# Patient Record
Sex: FEMALE | Employment: FULL TIME | URBAN - METROPOLITAN AREA
[De-identification: names, ages, dates, MRNs, and addresses within clinical notes are randomized per-mention and may not be internally consistent; named-entity substitution may affect disease eponyms.]

---

## 2019-04-15 ENCOUNTER — OFFICE VISIT (OUTPATIENT)
Dept: PRIMARY CARE CLINIC | Age: 59
End: 2019-04-15

## 2019-04-15 VITALS
SYSTOLIC BLOOD PRESSURE: 110 MMHG | HEART RATE: 83 BPM | DIASTOLIC BLOOD PRESSURE: 65 MMHG | RESPIRATION RATE: 18 BRPM | HEIGHT: 62 IN | TEMPERATURE: 98.4 F | OXYGEN SATURATION: 98 % | WEIGHT: 131.2 LBS | BODY MASS INDEX: 24.14 KG/M2

## 2019-04-15 DIAGNOSIS — R29.810 FACIAL WEAKNESS: ICD-10-CM

## 2019-04-15 DIAGNOSIS — K21.9 GASTROESOPHAGEAL REFLUX DISEASE WITHOUT ESOPHAGITIS: ICD-10-CM

## 2019-04-15 DIAGNOSIS — Z00.00 ENCOUNTER FOR WELLNESS EXAMINATION IN ADULT: Primary | ICD-10-CM

## 2019-04-15 DIAGNOSIS — Q67.0 FACIAL ASYMMETRY: ICD-10-CM

## 2019-04-15 DIAGNOSIS — Z12.11 SCREENING FOR COLON CANCER: ICD-10-CM

## 2019-04-15 DIAGNOSIS — Z12.39 SCREENING FOR BREAST CANCER: ICD-10-CM

## 2019-04-15 DIAGNOSIS — Z11.59 ENCOUNTER FOR HEPATITIS C SCREENING TEST FOR LOW RISK PATIENT: ICD-10-CM

## 2019-04-15 DIAGNOSIS — Z78.0 POSTMENOPAUSAL: ICD-10-CM

## 2019-04-15 DIAGNOSIS — I10 ESSENTIAL HYPERTENSION: ICD-10-CM

## 2019-04-15 RX ORDER — SIMETHICONE 125 MG
125 TABLET,CHEWABLE ORAL
COMMUNITY

## 2019-04-15 RX ORDER — RANITIDINE 150 MG/1
150 TABLET, FILM COATED ORAL AS NEEDED
COMMUNITY
End: 2020-07-30 | Stop reason: ALTCHOICE

## 2019-04-15 RX ORDER — AMLODIPINE BESYLATE 10 MG/1
10 TABLET ORAL DAILY
Qty: 30 TAB | Refills: 1 | Status: SHIPPED | OUTPATIENT
Start: 2019-04-15 | End: 2019-05-20 | Stop reason: SDUPTHER

## 2019-04-15 RX ORDER — AMLODIPINE BESYLATE 10 MG/1
TABLET ORAL DAILY
COMMUNITY
End: 2019-04-15 | Stop reason: SDUPTHER

## 2019-04-15 NOTE — PATIENT INSTRUCTIONS
Well Visit, Women 48 to 72: Care Instructions  Your Care Instructions    Physical exams can help you stay healthy. Your doctor has checked your overall health and may have suggested ways to take good care of yourself. He or she also may have recommended tests. At home, you can help prevent illness with healthy eating, regular exercise, and other steps. Follow-up care is a key part of your treatment and safety. Be sure to make and go to all appointments, and call your doctor if you are having problems. It's also a good idea to know your test results and keep a list of the medicines you take. How can you care for yourself at home? · Reach and stay at a healthy weight. This will lower your risk for many problems, such as obesity, diabetes, heart disease, and high blood pressure. · Get at least 30 minutes of exercise on most days of the week. Walking is a good choice. You also may want to do other activities, such as running, swimming, cycling, or playing tennis or team sports. · Do not smoke. Smoking can make health problems worse. If you need help quitting, talk to your doctor about stop-smoking programs and medicines. These can increase your chances of quitting for good. · Protect your skin from too much sun. When you're outdoors from 10 a.m. to 4 p.m., stay in the shade or cover up with clothing and a hat with a wide brim. Wear sunglasses that block UV rays. Even when it's cloudy, put broad-spectrum sunscreen (SPF 30 or higher) on any exposed skin. · See a dentist one or two times a year for checkups and to have your teeth cleaned. · Wear a seat belt in the car. · Limit alcohol to 1 drink a day. Too much alcohol can cause health problems. Follow your doctor's advice about when to have certain tests. These tests can spot problems early. · Cholesterol.  Your doctor will tell you how often to have this done based on your age, family history, or other things that can increase your risk for heart attack and stroke. · Blood pressure. Have your blood pressure checked during a routine doctor visit. Your doctor will tell you how often to check your blood pressure based on your age, your blood pressure results, and other factors. · Mammogram. Ask your doctor how often you should have a mammogram, which is an X-ray of your breasts. A mammogram can spot breast cancer before it can be felt and when it is easiest to treat. · Pap test and pelvic exam. Ask your doctor how often you should have a Pap test. You may not need to have a Pap test as often as you used to. · Vision. Have your eyes checked every year or two or as often as your doctor suggests. Some experts recommend that you have yearly exams for glaucoma and other age-related eye problems starting at age 48. · Hearing. Tell your doctor if you notice any change in your hearing. You can have tests to find out how well you hear. · Diabetes. Ask your doctor whether you should have tests for diabetes. · Colon cancer. You should begin tests for colon cancer at age 48. You may have one of several tests. Your doctor will tell you how often to have tests based on your age and risk. Risks include whether you already had a precancerous polyp removed from your colon or whether your parents, sisters and brothers, or children have had colon cancer. · Thyroid disease. Talk to your doctor about whether to have your thyroid checked as part of a regular physical exam. Women have an increased chance of a thyroid problem. · Osteoporosis. You should begin tests for bone density at age 72. If you are younger than 72, ask your doctor whether you have factors that may increase your risk for this disease. You may want to have this test before age 72. · Heart attack and stroke risk. At least every 4 to 6 years, you should have your risk for heart attack and stroke assessed.  Your doctor uses factors such as your age, blood pressure, cholesterol, and whether you smoke or have diabetes to show what your risk for a heart attack or stroke is over the next 10 years. When should you call for help? Watch closely for changes in your health, and be sure to contact your doctor if you have any problems or symptoms that concern you. Where can you learn more? Go to http://oksana-damián.info/. Enter R776 in the search box to learn more about \"Well Visit, Women 50 to 72: Care Instructions. \"  Current as of: March 28, 2018  Content Version: 11.9  © 9834-1583 [employer]. Care instructions adapted under license by [employer] (which disclaims liability or warranty for this information). If you have questions about a medical condition or this instruction, always ask your healthcare professional. [address] any warranty or liability for your use of this information.

## 2019-04-15 NOTE — PROGRESS NOTES
HPI:     Chief Complaint   Patient presents with    Establish Care    Complete Physical       Danny Hernández is a 60 y.o. female with significant history of HTN, GERD who presents as new patient for physical.  She is here with her daughter and family friend. She declines interpretor services, prefers to have daughter and friend translate. Originally from [place]. Patient is concerned about chronic left sided facial weakness, eye twitching, facial asymmetry for the past 5 years. No acute worsening of symptoms. Denies any known history of stroke, cerebrovascular disease. Denies associated vision change, blurry vision, confusion, dizziness, arm or leg weakness, paresthesias, headache, speech difficulty, difficulty walking. HTN - diagnosed 5 years ago. Compliant with Amlodipine 10mg daily. Tolerating med well without side effects. She denies chest pain, palpitations, dyspnea, peripheral edema. Does not check BP at home. GERD - stable and well controlled with PRN Zantac. Symptoms of belching, heartburn, reflux well controlled with med. Symptoms exacerbated by spicy foods, which she avoids. Patient has appointment to establish with OB/GYN Dr. Paul Gu in June. Reports LMP was 8 years ago. States that she has never had a pap smear, mammogram, or DEXA scan. Patient Active Problem List   Diagnosis Code    Essential hypertension I10    Gastroesophageal reflux disease without esophagitis K21.9     Current Outpatient Medications   Medication Sig Dispense Refill    raNITIdine (ZANTAC) 150 mg tablet Take 150 mg by mouth as needed for Indigestion.  simethicone (GAS-X EXTRA STRENGTH) 125 mg chewable tablet Take 125 mg by mouth every six (6) hours as needed for Flatulence.  amLODIPine (NORVASC) 10 mg tablet Take 1 Tab by mouth daily.  30 Tab 1     No Known Allergies  Past Medical History:   Diagnosis Date    GERD (gastroesophageal reflux disease)     Hypertension      Past Surgical History:   Procedure Laterality Date    HX GI  1999    part of colon removed     Family History   Problem Relation Age of Onset    Other Mother         brain aneurysm    No Known Problems Father     Colon Cancer Sister         age 46s     Social History     Tobacco Use    Smoking status: Never Smoker    Smokeless tobacco: Never Used   Substance Use Topics    Alcohol use: Never     Frequency: Never          ROS:     ROS:  Feeling well. No dyspnea, palpitations, or chest pain on exertion. No abdominal pain, change in appetite, nausea, vomiting, change in bowel habits, black or bloody stools. No urinary tract symptoms. GYN ROS: no abnormal bleeding, pelvic pain or discharge, no breast pain or new or enlarging lumps on self exam. No fever or chills. Otherwise, as documented in HPI. Physical Exam:     Vitals:    04/15/19 1417   BP: 110/65   Pulse: 83   Resp: 18   Temp: 98.4 °F (36.9 °C)   TempSrc: Oral   SpO2: 98%   Weight: 131 lb 3.2 oz (59.5 kg)   Height: 5' 2\" (1.575 m)        Nursing Documentation and Vital Signs Reviewed.      Physical Examination:   General appearance - alert, well appearing, and in no distress  Mental status - alert, oriented to person, place, and time, normal mood, behavior, speech, dress, motor activity, and thought processes  Eyes - pupils equal and reactive, left sided ptosis noted   Ears - bilateral TM's and external ear canals normal  Nose - normal and patent, no erythema, discharge or polyps  Mouth - mucous membranes moist, pharynx normal without lesions  Neck - supple, no significant adenopathy, carotids upstroke normal bilaterally, no bruits, thyroid is normal in size without nodules or tenderness  Chest - clear to auscultation, no wheezes, rales or rhonchi, symmetric air entry  Heart - normal rate, regular rhythm, normal S1, S2, no murmurs, rubs, clicks or gallops  Abdomen - soft, nontender, nondistended, no masses or organomegaly  Neurological - alert, oriented, normal speech. Asymmetric facial muscle function noted, left sided facial droop. Neck supple without rigidity, normal muscle tone, no tremors, strength 5/5  Musculoskeletal - no joint tenderness, deformity or swelling  Extremities - peripheral pulses normal, no pedal edema, no clubbing or cyanosis      Assessment/ Plan:   Healthy adult female. Full age appropriate history and physical exam as well as health care maintenance  performed and discussed today. Risk factor modification discussed today includes safe sex practices, healthy diet and exercise, and seat belt use. Counseled on breast self-exam and mammography schedule. Pap smear schedule reviewed. Continue current medications as prescribed. Diagnoses and all orders for this visit:    1. Encounter for wellness examination in adult  -     CBC WITH AUTOMATED DIFF  -     METABOLIC PANEL, COMPREHENSIVE  -     TSH AND FREE T4  -     LIPID PANEL  -     HEMOGLOBIN A1C WITH EAG    2. Facial weakness  -     Patient with 5 year history of left sided facial weakness, asymmetry. Denies history of stroke, cerebrovascular disease. No acute worsening of symptoms from patient's baseline. Will refer to neurology for further evaluation. Discussed with Dr. Rebecca Lamb who agrees with plan. -     REFERRAL TO NEUROLOGY    3. Facial asymmetry  -     See #2.  -     REFERRAL TO NEUROLOGY    4. Essential hypertension  -     Stable and well controlled. -     Continue amLODIPine (NORVASC) 10 mg tablet; Take 1 Tab by mouth daily. Tolerating well without side effects.  -     Advised to periodically monitor BP at home and follow-up if persistently >302/71X.   -     METABOLIC PANEL, COMPREHENSIVE  -     LIPID PANEL  -     HEMOGLOBIN A1C WITH EAG    5. Gastroesophageal reflux disease without esophagitis        -     Stable and well controlled with Zantac. 6. Screening for breast cancer  -     [facility] 3D PRIYANKA W MAMMO BI SCREENING INCL CAD; Future    7.  Screening for colon cancer  - REFERRAL TO GASTROENTEROLOGY    8. Postmenopausal  -     DEXA BONE DENSITY STUDY AXIAL; Future    9. Encounter for hepatitis C screening test for low risk patient  -     HEPATITIS C AB      Follow-up and Dispositions    · Return in about 3 months (around 7/15/2019) for blood pressure follow-up. Discussed expected course/resolution/complications of diagnosis in detail with patient. Medication risks/benefits/costs/interactions/alternatives discussed with patient. Pt was given after visit summary which includes diagnoses, current medications & vitals.    Pt expressed understanding with the diagnosis and plan

## 2019-04-17 ENCOUNTER — TELEPHONE (OUTPATIENT)
Dept: PRIMARY CARE CLINIC | Age: 59
End: 2019-04-17

## 2019-04-17 LAB
ALBUMIN SERPL-MCNC: 4.9 G/DL (ref 3.6–4.8)
ALBUMIN/GLOB SERPL: 1.4 {RATIO} (ref 1.2–2.2)
ALP SERPL-CCNC: 85 IU/L (ref 39–117)
ALT SERPL-CCNC: 18 IU/L (ref 0–32)
AST SERPL-CCNC: 25 IU/L (ref 0–40)
BASOPHILS # BLD AUTO: 0 X10E3/UL (ref 0–0.2)
BASOPHILS NFR BLD AUTO: 1 %
BILIRUB SERPL-MCNC: 0.4 MG/DL (ref 0–1.2)
BUN SERPL-MCNC: 12 MG/DL (ref 8–27)
BUN/CREAT SERPL: 14 (ref 12–28)
CALCIUM SERPL-MCNC: 9.9 MG/DL (ref 8.7–10.3)
CHLORIDE SERPL-SCNC: 101 MMOL/L (ref 96–106)
CHOLEST SERPL-MCNC: 211 MG/DL (ref 100–199)
CO2 SERPL-SCNC: 20 MMOL/L (ref 20–29)
CREAT SERPL-MCNC: 0.85 MG/DL (ref 0.57–1)
EOSINOPHIL # BLD AUTO: 0.2 X10E3/UL (ref 0–0.4)
EOSINOPHIL NFR BLD AUTO: 5 %
ERYTHROCYTE [DISTWIDTH] IN BLOOD BY AUTOMATED COUNT: 14.6 % (ref 12.3–15.4)
EST. AVERAGE GLUCOSE BLD GHB EST-MCNC: 105 MG/DL
GLOBULIN SER CALC-MCNC: 3.6 G/DL (ref 1.5–4.5)
GLUCOSE SERPL-MCNC: 90 MG/DL (ref 65–99)
HBA1C MFR BLD: 5.3 % (ref 4.8–5.6)
HCT VFR BLD AUTO: 42.5 % (ref 34–46.6)
HCV AB S/CO SERPL IA: >11 S/CO RATIO (ref 0–0.9)
HDLC SERPL-MCNC: 49 MG/DL
HGB BLD-MCNC: 13.8 G/DL (ref 11.1–15.9)
IMM GRANULOCYTES # BLD AUTO: 0 X10E3/UL (ref 0–0.1)
IMM GRANULOCYTES NFR BLD AUTO: 0 %
LDLC SERPL CALC-MCNC: 132 MG/DL (ref 0–99)
LYMPHOCYTES # BLD AUTO: 1.8 X10E3/UL (ref 0.7–3.1)
LYMPHOCYTES NFR BLD AUTO: 34 %
MCH RBC QN AUTO: 28.2 PG (ref 26.6–33)
MCHC RBC AUTO-ENTMCNC: 32.5 G/DL (ref 31.5–35.7)
MCV RBC AUTO: 87 FL (ref 79–97)
MONOCYTES # BLD AUTO: 0.3 X10E3/UL (ref 0.1–0.9)
MONOCYTES NFR BLD AUTO: 6 %
NEUTROPHILS # BLD AUTO: 3 X10E3/UL (ref 1.4–7)
NEUTROPHILS NFR BLD AUTO: 54 %
PLATELET # BLD AUTO: 266 X10E3/UL (ref 150–379)
POTASSIUM SERPL-SCNC: 4.1 MMOL/L (ref 3.5–5.2)
PROT SERPL-MCNC: 8.5 G/DL (ref 6–8.5)
RBC # BLD AUTO: 4.9 X10E6/UL (ref 3.77–5.28)
SODIUM SERPL-SCNC: 143 MMOL/L (ref 134–144)
T4 FREE SERPL-MCNC: 1.35 NG/DL (ref 0.82–1.77)
TRIGL SERPL-MCNC: 149 MG/DL (ref 0–149)
TSH SERPL DL<=0.005 MIU/L-ACNC: 2.66 UIU/ML (ref 0.45–4.5)
VLDLC SERPL CALC-MCNC: 30 MG/DL (ref 5–40)
WBC # BLD AUTO: 5.3 X10E3/UL (ref 3.4–10.8)

## 2019-04-17 NOTE — TELEPHONE ENCOUNTER
----- Message from Tosha Baeza NP sent at 0/63/0636  1:41 PM EDT -----  Please notify patient:    Hepatitis C antibody screening is positive. Needs to return for follow-up blood work. Total cholesterol and LDL (bad cholesterol) are elevated above the normal range. No medication is indicated at this time, but work on diet and exercise. Decrease fried, fatty foods. Bake, broil, grill, or steam foods instead of frying them. Eat fish, skinless poultry, limit high-fat meats. Try to eat two servings of fish a week (such as salmon). Increase fruits, vegetables, fiber, whole-grains. Limit alcohol intake. Try to engage in daily physical activity, 150 minutes per week of exercise is recommended. Otherwise, CBC, kidney, liver, and thyroid level are normal.  No diabetes (Hgb A1c is within normal range).

## 2019-04-17 NOTE — PROGRESS NOTES
Please notify patient:    Hepatitis C antibody screening is positive. Needs to return for follow-up blood work. Total cholesterol and LDL (bad cholesterol) are elevated above the normal range. No medication is indicated at this time, but work on diet and exercise. Decrease fried, fatty foods. Bake, broil, grill, or steam foods instead of frying them. Eat fish, skinless poultry, limit high-fat meats. Try to eat two servings of fish a week (such as salmon). Increase fruits, vegetables, fiber, whole-grains. Limit alcohol intake. Try to engage in daily physical activity, 150 minutes per week of exercise is recommended. Otherwise, CBC, kidney, liver, and thyroid level are normal.  No diabetes (Hgb A1c is within normal range).

## 2019-04-22 ENCOUNTER — HOSPITAL ENCOUNTER (OUTPATIENT)
Dept: MAMMOGRAPHY | Age: 59
Discharge: HOME | End: 2019-04-22
Attending: NURSE PRACTITIONER
Payer: COMMERCIAL

## 2019-04-22 DIAGNOSIS — Z12.39 SCREENING FOR BREAST CANCER: ICD-10-CM

## 2019-04-22 PROCEDURE — 77063 BREAST TOMOSYNTHESIS BI: CPT

## 2019-04-29 ENCOUNTER — HOSPITAL ENCOUNTER (OUTPATIENT)
Dept: MAMMOGRAPHY | Age: 59
Discharge: HOME | End: 2019-04-29
Payer: COMMERCIAL

## 2019-04-29 DIAGNOSIS — Z78.0 POSTMENOPAUSAL: ICD-10-CM

## 2019-04-29 PROCEDURE — 77080 DXA BONE DENSITY AXIAL: CPT

## 2019-05-01 ENCOUNTER — TELEPHONE (OUTPATIENT)
Dept: PRIMARY CARE CLINIC | Age: 59
End: 2019-05-01

## 2019-05-01 NOTE — PROGRESS NOTES
Please call patient:    Bone density scan shows osteoporosis (low bone density). If possible, I'd like to have patient set up appointment to discuss further/discuss medication options.

## 2019-05-01 NOTE — TELEPHONE ENCOUNTER
----- Message from Cailin John NP sent at 4/2/0676  7:33 AM EDT -----  Please call patient:    Bone density scan shows osteoporosis (low bone density). If possible, I'd like to have patient set up appointment to discuss further/discuss medication options.

## 2019-05-14 ENCOUNTER — OFFICE VISIT (OUTPATIENT)
Dept: NEUROLOGY | Age: 59
End: 2019-05-14

## 2019-05-14 VITALS
BODY MASS INDEX: 23.89 KG/M2 | HEIGHT: 62 IN | WEIGHT: 129.8 LBS | OXYGEN SATURATION: 98 % | SYSTOLIC BLOOD PRESSURE: 124 MMHG | RESPIRATION RATE: 18 BRPM | HEART RATE: 87 BPM | DIASTOLIC BLOOD PRESSURE: 80 MMHG

## 2019-05-14 DIAGNOSIS — G51.39 HEMIFACIAL SPASM: Primary | ICD-10-CM

## 2019-05-14 DIAGNOSIS — G24.5 BLEPHAROSPASM: ICD-10-CM

## 2019-05-14 NOTE — LETTER
5/14/2019 10:27 AM 
 
Patient:  Teddy Trujillo YOB: 1960 Date of Visit: 5/14/2019 Dear Chula Lauren, ELIZABETH 
[address] [place] [id number] VIA In Basket 
 : Thank you for referring Ms. Danny Hernández to me for evaluation/treatment. Below are the relevant portions of my assessment and plan of care. If you have questions, please do not hesitate to call me. I look forward to following Ms. Hernández along with you. Sincerely, Leila Horowitz MD

## 2019-05-14 NOTE — PATIENT INSTRUCTIONS
A Healthy Lifestyle: Care Instructions Your Care Instructions A healthy lifestyle can help you feel good, stay at a healthy weight, and have plenty of energy for both work and play. A healthy lifestyle is something you can share with your whole family. A healthy lifestyle also can lower your risk for serious health problems, such as high blood pressure, heart disease, and diabetes. You can follow a few steps listed below to improve your health and the health of your family. Follow-up care is a key part of your treatment and safety. Be sure to make and go to all appointments, and call your doctor if you are having problems. It's also a good idea to know your test results and keep a list of the medicines you take. How can you care for yourself at home? · Do not eat too much sugar, fat, or fast foods. You can still have dessert and treats now and then. The goal is moderation. · Start small to improve your eating habits. Pay attention to portion sizes, drink less juice and soda pop, and eat more fruits and vegetables. ? Eat a healthy amount of food. A 3-ounce serving of meat, for example, is about the size of a deck of cards. Fill the rest of your plate with vegetables and whole grains. ? Limit the amount of soda and sports drinks you have every day. Drink more water when you are thirsty. ? Eat at least 5 servings of fruits and vegetables every day. It may seem like a lot, but it is not hard to reach this goal. A serving or helping is 1 piece of fruit, 1 cup of vegetables, or 2 cups of leafy, raw vegetables. Have an apple or some carrot sticks as an afternoon snack instead of a candy bar. Try to have fruits and/or vegetables at every meal. 
· Make exercise part of your daily routine. You may want to start with simple activities, such as walking, bicycling, or slow swimming. Try to be active 30 to 60 minutes every day.  You do not need to do all 30 to 60 minutes all at once. For example, you can exercise 3 times a day for 10 or 20 minutes. Moderate exercise is safe for most people, but it is always a good idea to talk to your doctor before starting an exercise program. 
· Keep moving. [place] the lawn, work in the garden, or [employer]. Take the stairs instead of the elevator at work. · If you smoke, quit. People who smoke have an increased risk for heart attack, stroke, cancer, and other lung illnesses. Quitting is hard, but there are ways to boost your chance of quitting tobacco for good. ? Use nicotine gum, patches, or lozenges. ? Ask your doctor about stop-smoking programs and medicines. ? Keep trying. In addition to reducing your risk of diseases in the future, you will notice some benefits soon after you stop using tobacco. If you have shortness of breath or asthma symptoms, they will likely get better within a few weeks after you quit. · Limit how much alcohol you drink. Moderate amounts of alcohol (up to 2 drinks a day for men, 1 drink a day for women) are okay. But drinking too much can lead to liver problems, high blood pressure, and other health problems. Family health If you have a family, there are many things you can do together to improve your health. · Eat meals together as a family as often as possible. · Eat healthy foods. This includes fruits, vegetables, lean meats and dairy, and whole grains. · Include your family in your fitness plan. Most people think of activities such as jogging or tennis as the way to fitness, but there are many ways you and your family can be more active. Anything that makes you breathe hard and gets your heart pumping is exercise. Here are some tips: 
? Walk to do errands or to take your child to school or the bus. 
? Go for a family bike ride after dinner instead of watching TV. Where can you learn more? Go to http://oksana-damián.info/. Enter Y938 in the search box to learn more about \"A Healthy Lifestyle: Care Instructions. \" Current as of: September 11, 2018 Content Version: 11.9 © 4002-8800 [employer]. Care instructions adapted under license by [employer] (which disclaims liability or warranty for this information). If you have questions about a medical condition or this instruction, always ask your healthcare professional. [place] [address] any warranty or liability for your use of this information. PRESCRIPTION REFILL POLICY [facility] Statement to Patients April 1, 2014 In an effort to ensure the large volume of patient prescription refills is processed in the most efficient and expeditious manner, we are asking our patients to assist us by calling your Pharmacy for all prescription refills, this will include also your  Mail Order Pharmacy. The pharmacy will contact our office electronically to continue the refill process. Please do not wait until the last minute to call your pharmacy. We need at least 48 hours (2days) to fill prescriptions. We also encourage you to call your pharmacy before going to  your prescription to make sure it is ready. With regard to controlled substance prescription refill requests (narcotic refills) that need to be picked up at our office, we ask your cooperation by providing us with at least 72 hours (3days) notice that you will need a refill. We will not refill narcotic prescription refill requests after 4:00pm on any weekday, Monday through Thursday, or after 2:00pm on Fridays, or on the weekends. We encourage everyone to explore another way of getting your prescription refill request processed using [employer], our patient web portal through our electronic medical record system.  MAPPINGt is an efficient and effective way to communicate your medication request directly to the office and downloadable as an chico on your smart phone . amiando also features a review functionality that allows you to view your medication list as well as leave messages for your physician. Are you ready to get connected? If so please review the attatched instructions or speak to any of our staff to get you set up right away! Thank you so much for your cooperation. Should you have any questions please contact our Practice Administrator. The Physicians and Staff,  [facility]

## 2019-05-14 NOTE — PROGRESS NOTES
Chief Complaint Patient presents with  Neurologic Problem HISTORY OF PRESENT ILLNESS Zhanna Silva is a 60 y.o. female who came in for neurological consultation requested by Dr. Diana Jaime. For the past 5 or 6 years she has been experiencing clonic twitching of her left face muscles and it seems to be getting worse with time. The left eye will tend to blink repeatedly in the face will pull sideways/upwards. The muscles on the side of her neck will also twitch at times. Denies any associated pain or headache. No history of Bell's palsy. No difficulties with swallowing or chewing. Recently she has started to notice that the right has started to quiver. No other neurological symptoms Past Medical History:  
Diagnosis Date  GERD (gastroesophageal reflux disease)  Hypertension  Liver disease 04/2019 Hep C Current Outpatient Medications Medication Sig  
 raNITIdine (ZANTAC) 150 mg tablet Take 150 mg by mouth as needed for Indigestion.  simethicone (GAS-X EXTRA STRENGTH) 125 mg chewable tablet Take 125 mg by mouth every six (6) hours as needed for Flatulence.  amLODIPine (NORVASC) 10 mg tablet Take 1 Tab by mouth daily. No current facility-administered medications for this visit. No Known Allergies Family History Problem Relation Age of Onset  Other Mother   
     brain aneurysm  No Known Problems Father  Colon Cancer Sister   
     age 46s Social History Tobacco Use  Smoking status: Never Smoker  Smokeless tobacco: Never Used Substance Use Topics  Alcohol use: Never Frequency: Never  Drug use: Never Past Surgical History:  
Procedure Laterality Date  HX GI  1999  
 part of colon removed REVIEW OF SYSTEMS Review of Systems - History obtained from the patient Psychological ROS: negative ENT ROS: negative Hematological and Lymphatic ROS: negative Endocrine ROS: negative Respiratory ROS: no cough, shortness of breath, or wheezing Cardiovascular ROS: no chest pain or dyspnea on exertion Gastrointestinal ROS: no abdominal pain, change in bowel habits, or black or bloody stools Genito-Urinary ROS: no dysuria, trouble voiding, or hematuria Musculoskeletal ROS: negative Dermatological ROS: negative PHYSICAL EXAMINATION:   
Visit Vitals /80 Pulse 87 Resp 18 Ht 5' 2\" (1.575 m) Wt 58.9 kg (129 lb 12.8 oz) SpO2 98% BMI 23.74 kg/m² General:  Well nourished, and groomed individual in no acute distress. Neck: Supple, nontender, thyroid within normal limits, no JVD, no bruits, no pain with resistance to active range of motion. Heart: Regular rate and rhythm, no murmurs, rub, or gallop. Normal S1S2. Lungs:  Clear to auscultation bilaterally with equal chest expansion, no cough, no wheeze Musculoskeletal:  Extremities revealed no edema and had full range of motion of joints. Psych:  Good mood and bright affect NEUROLOGICAL EXAMINATION:    
Mental Status:   Alert and oriented to person, place, and time. Attention span and concentration are normal. Speech is fluent with a full fund of knowledge. Cranial Nerves:   
II, III, IV, VI:  Visual acuity grossly intact. Visual fields are normal.   
Pupils are equal, round, and reactive to light and accommodation. Extra-ocular movements are full and fluid. V-XII: Hearing is grossly intact. Facial features are symmetric, with normal sensation and strength. Hemifacial spasm was noted involving the orbicularis oculi, alaque nasi, zygomaticus and risorius muscles. Some twitching was also noted in the right orbicularis oculi. The palate rises symmetrically and the tongue protrudes midline. Sternocleidomastoids 5/5. Motor Examination: Normal tone, bulk, and strength. 5/5 muscle strength throughout. No cogwheel rigidity or clonus present. Sensory exam:  Normal throughout to pinprick, temperature, and vibration sense. Normal proprioception. Coordination:  Heel-to-shin was smooth and symmetrical bilaterally. Finger to nose and rapid arm movement testing was normal.   No resting or intention tremor Gait and Station:  Steady while walking on toes, heels, and with tandem walking. Normal arm swing. No Rhomberg or pronator drift. No muscle wasting or fasiculations noted. Reflexes:  DTRs 2+ throughout. Toes downgoing. ASSESSMENT 
  ICD-10-CM ICD-9-CM 1. Hemifacial spasm G51.39 351.8 MRI BRAIN W WO CONT 2. Blepharospasm G24.5 333.81 MRI BRAIN W WO CONT  
 
 
DISCUSSION Ms. Danny Hernández has hemifacial spasm on the left side and blepharospasm on both sides, left worse than right I have recommended MRI scan of the brain to rule out any structural cause of irritation of the facial nerve Treatment options were discussed and that botulinum toxin being the only effective medication She seemed hesitant and reluctant to do Botox at this time and will let me know if she decides to go forward I will let her know about the MRI results Thank you for allowing me to participate in the care of Ms. Hernández. Please feel free to contact me if you have any questions. I will be happy to follow to follow her along with you. Carline Durham MD 
Diplomate, American Board of Psychiatry & Neurology (Neurology) [place] Board of Psychiatry & Neurology (Clinical Neurophysiology) Diplomate, [address] [place] [address] Board of Electrodiagnostic Medicine This note will not be viewable in [address]

## 2019-05-20 ENCOUNTER — OFFICE VISIT (OUTPATIENT)
Dept: PRIMARY CARE CLINIC | Age: 59
End: 2019-05-20

## 2019-05-20 VITALS
DIASTOLIC BLOOD PRESSURE: 74 MMHG | RESPIRATION RATE: 16 BRPM | HEIGHT: 62 IN | WEIGHT: 130.8 LBS | BODY MASS INDEX: 24.07 KG/M2 | OXYGEN SATURATION: 99 % | SYSTOLIC BLOOD PRESSURE: 117 MMHG | TEMPERATURE: 97.7 F | HEART RATE: 74 BPM

## 2019-05-20 DIAGNOSIS — M81.0 AGE-RELATED OSTEOPOROSIS WITHOUT CURRENT PATHOLOGICAL FRACTURE: Primary | ICD-10-CM

## 2019-05-20 DIAGNOSIS — I10 ESSENTIAL HYPERTENSION: ICD-10-CM

## 2019-05-20 DIAGNOSIS — R76.8 HEPATITIS C ANTIBODY TEST POSITIVE: ICD-10-CM

## 2019-05-20 RX ORDER — AMLODIPINE BESYLATE 10 MG/1
10 TABLET ORAL DAILY
Qty: 90 TAB | Refills: 0 | Status: SHIPPED | OUTPATIENT
Start: 2019-05-20 | End: 2019-06-26 | Stop reason: DRUGHIGH

## 2019-05-20 NOTE — PROGRESS NOTES
Chief Complaint   Patient presents with    Osteoporosis     here to discuss results and treatment options    Hepatitis C     rescreen    Medication Refill     BP Meds     1. Have you been to the ER, urgent care clinic since your last visit? Hospitalized since your last visit? No    2. Have you seen or consulted any other health care providers outside of the [address] [place] [address] since your last visit? Include any pap smears or colon screening.  No

## 2019-05-20 NOTE — PROGRESS NOTES
HPI:     Chief Complaint   Patient presents with    Osteoporosis     here to discuss results and treatment options    Hepatitis C     rescreen    Medication Refill     BP Meds        Patient is a 60 y.o. female with significant history of HTN, GERD who presents with her daughter follow-up from recent DEXA scan. She declines interpretor services, prefers to have daughter translate for her. Recent DEXA showed osteoporosis of lumbar spine (t score -2.5), osteopenia of left total hip (t score -0.8) and femoral neck (t score -2.0). This was patient's first DEXA scan. She denies any recent falls or fractures. Denies any back pain, joint pains. She does not smoke or drink alcohol. Recent lab work also positive for hep C antibody. She is here for follow-up lab work. She is asymptomatic. Blood pressure well controlled with amlodipine 10mg. Tolerating well without side effects. She would like refill. Patient denies fever, chills, dizziness, headache, fatigue, syncope, chest pain, palpitations, dyspnea, abdominal pain, change in appetite, nausea, vomiting, constipation, and diarrhea. Patient Active Problem List   Diagnosis Code    Essential hypertension I10    Gastroesophageal reflux disease without esophagitis K21.9    Age-related osteoporosis without current pathological fracture M81.0     Current Outpatient Medications   Medication Sig Dispense Refill    denosumab (PROLIA) 60 mg/mL injection 1 mL by SubCUTAneous route once for 1 dose. 1 mL 0    amLODIPine (NORVASC) 10 mg tablet Take 1 Tab by mouth daily. 90 Tab 0    simethicone (GAS-X EXTRA STRENGTH) 125 mg chewable tablet Take 125 mg by mouth every six (6) hours as needed for Flatulence.  raNITIdine (ZANTAC) 150 mg tablet Take 150 mg by mouth as needed for Indigestion.        No Known Allergies  Past Medical History:   Diagnosis Date    GERD (gastroesophageal reflux disease)     Hypertension     Osteoporosis           ROS: Pertinent items are noted in HPI. Objective:     Vitals:    05/20/19 0908   BP: 117/74   Pulse: 74   Resp: 16   Temp: 97.7 °F (36.5 °C)   TempSrc: Oral   SpO2: 99%   Weight: 130 lb 12.8 oz (59.3 kg)   Height: 5' 2\" (1.575 m)        Vitals and Nurse Documentation reviewed. Physical Examination:   General appearance - alert, well appearing, and in no distress  Mental status - alert, oriented to person, place, and time, normal mood, behavior, speech, dress, motor activity, and thought processes  Eyes - pupils equal and reactive, extraocular eye movements intact  Chest - clear to auscultation, no wheezes, rales or rhonchi, symmetric air entry  Heart - normal rate, regular rhythm, normal S1, S2, no murmurs, rubs, clicks or gallops  Neurological - alert, oriented, normal speech, no focal findings or movement disorder noted  Musculoskeletal - no joint tenderness, deformity or swelling  Extremities - peripheral pulses normal, no pedal edema, no clubbing or cyanosis      Assessment/ Plan:   Diagnoses and all orders for this visit:    1. Age-related osteoporosis without current pathological fracture  -     Discussed options, pros and cons of each, and patient would like to proceed with Prolia. -     Start Denosumab (PROLIA) 60 mg/mL injection; 1 mL by SubCUTAneous route once for 1 dose. Medication benefits, risks, indication, dosage, potential adverse effects, and alternate medication options were discussed with patient who expressed understanding. Paperwork has been started by [employer].   -     Start Vitamin D 800 units and calcium 1,200 mg daily.    -     Advised weight bearing exercise, like walking 30-45 minutes 4-5 times per week. Discussed fall prevention.   -     Repeat DEXA scan in 2 years. 2. Hepatitis C antibody test positive  -     HEPATITIS C QT BY PCR WITH REFLEX GENOTYPE  -     REFERRAL TO GASTROENTEROLOGY    3. Essential hypertension  -     BP stable and well controlled.   -     Continue amLODIPine (NORVASC) 10 mg tablet; Take 1 Tab by mouth daily. Tolerating well without side effects       Follow-up and Dispositions    · Return in about 3 months (around 8/20/2019) for blood pressure . I have discussed the diagnosis with the patient and the intended plan as seen in the above orders. Advised prompt follow-up if symptoms worsen or fail to improve and symptoms that would warrant emergent evaluation in ED. The patient has received an after-visit summary and questions were answered concerning future plans. I have discussed medication side effects and warnings with the patient as well. Patient expressed understanding and is in agreement with the diagnosis and plan.

## 2019-05-20 NOTE — PATIENT INSTRUCTIONS
Osteoporosis: Care Instructions  Your Care Instructions    Osteoporosis causes bones to become thin and weak. It is much more common in women than in men. Osteoporosis may be very advanced before you know you have it. Sometimes the first sign is a broken bone in the hip, spine, or wrist or sudden pain in your middle or lower back. Follow-up care is a key part of your treatment and safety. Be sure to make and go to all appointments, and call your doctor if you are having problems. It's also a good idea to know your test results and keep a list of the medicines you take. How can you care for yourself at home? · Your doctor may prescribe a bisphosphonate, such as risedronate (Actonel) or alendronate (Fosamax), for osteoporosis. If you are taking one of these medicines by mouth:  ? Take your medicine with a full glass of water when you first get up in the morning. ? Do not lie down, eat, drink a beverage, or take any other medicine for at least 30 minutes after taking the drug. This helps prevent stomach problems. ? Do not take your medicine late in the day if you forgot to take it in the morning. Skip it, and take the usual dose the next morning. ? If you have side effects, tell your doctor. He or she may prescribe another medicine. · Get enough calcium and vitamin D. The [place] of Medicine recommends adults younger than age 46 need 1,000 mg of calcium and 600 IU of vitamin D each day. Women ages 46 to 79 need 1,200 mg of calcium and 600 IU of vitamin D each day. Men ages 46 to 79 need 1,000 mg of calcium and 600 IU of vitamin D each day. Adults 71 and older need 1,200 mg of calcium and 800 IU of vitamin D each day. ? Eat foods rich in calcium, like yogurt, cheese, milk, and dark green vegetables. This is a good way to get the calcium you need. You can get vitamin D from eggs, fatty fish, cereal, and milk. ? Talk to your doctor about taking a calcium plus vitamin D supplement. Be careful, though. Adults ages 23 to 48 should not get more than 2,500 mg of calcium and 4,000 IU of vitamin D each day, whether it is from supplements and/or food. Adults ages 46 and older should not get more than 2,000 mg of calcium and 4,000 IU of vitamin D each day from supplements and/or food. · Limit alcohol to 2 drinks a day for men and 1 drink a day for women. Too much alcohol can cause health problems. · Do not smoke. Smoking puts you at a much higher risk for osteoporosis. If you need help quitting, talk to your doctor about stop-smoking programs and medicines. These can increase your chances of quitting for good. · Get regular bone-building exercise. Weight-bearing and resistance exercises keep bones healthy by working the muscles and bones against gravity. Start out at an exercise level that feels right for you. Add a little at a time until you can do the following:  ? Do 30 minutes of weight-bearing exercise on most days of the week. Walking, jogging, stair climbing, and dancing are good choices. ? Do resistance exercises with weights or elastic bands 2 to 3 days a week. · Reduce your risk of falls:  ? Wear supportive shoes with low heels and nonslip soles. ? Use a cane or walker, if you need it. Use shower chairs and bath benches. Put in handrails on stairways, around your shower or tub area, and near the toilet. ? Keep stairs, porches, and walkways well lit. Use night-lights. ? Remove throw rugs and other objects that are in the way. ? Avoid icy, wet, or slippery surfaces. ? Keep a cordless phone and a flashlight with new batteries by your bed. When should you call for help? Watch closely for changes in your health, and be sure to contact your doctor if you have any problems. Where can you learn more? Go to http://oksana-damián.info/. Enter K100 in the search box to learn more about \"Osteoporosis: Care Instructions. \"  Current as of: March 15, 2018  Content Version: 11.9  © 0403-0430 Health[employer]. Care instructions adapted under license by [employer] (which disclaims liability or warranty for this information). If you have questions about a medical condition or this instruction, always ask your healthcare professional. [address] any warranty or liability for your use of this information. Denosumab (By injection)   Denosumab ([id number])  Treats osteoporosis, bone cancer, hypercalcemia, and other bone problems in patients who have cancer. Brand Name(s): Prolia, Xgeva   There may be other brand names for this medicine. When This Medicine Should Not Be Used: This medicine is not right for everyone. You should not receive it if you had an allergic reaction to denosumab or if you are pregnant. How to Use This Medicine:   Injectable  · A doctor or other health professional will give you this medicine. This medicine is usually given as a shot under the skin of your upper arm, upper thigh, or stomach. · This medicine should come with a Medication Guide. Ask your pharmacist for a copy if you do not have one. · Missed dose: Call your doctor or pharmacist for instructions. Drugs and Foods to Avoid:   Ask your doctor or pharmacist before using any other medicine, including over-the-counter medicines, vitamins, and herbal products. · Do not use Prolia® and Xgeva® together. They contain the same medicine. · Some medicines can affect how denosumab works. Tell your doctor if you are also using medicine that weakens your immune system, including a steroid or cancer medicine. Warnings While Using This Medicine:   · This medicine may cause birth defects if either partner is using it during conception or pregnancy. Tell your doctor right away if you or your partner becomes pregnant. Use an effective form of birth control. Women who are being treated with Sylvain Núñez should continue using birth control for at least 5 months after the last dose.   · Tell your doctor if you are breastfeeding, or if you have kidney disease, diabetes, gum disease, or an allergy to latex. Tell your doctor if you have problems with your thyroid, parathyroid, or digestive system. · This medicine may cause the following problems:  ¨ Low calcium levels in your blood  ¨ Increased risk of broken thigh bone  ¨ Increased risk of infections  ¨ Serious skin reactions  ¨ Severe bone, joint, or muscle pain  · This medicine can cause jaw problems. You must have regular dental exams while you are being treated with this medicine. Tell your dentist that you are using this medicine. Practice good oral hygiene. · Do not suddenly stop using Prolia® without checking first with your doctor. Doing so may increase risk for more fractures. Talk to your doctor about other medicine that you can take. · Your doctor will do lab tests at regular visits to check on the effects of this medicine. Keep all appointments.   Possible Side Effects While Using This Medicine:   Call your doctor right away if you notice any of these side effects:  · Allergic reaction: Itching or hives, swelling in your face or hands, swelling or tingling in your mouth or throat, chest tightness, trouble breathing  · Blistering, peeling, red skin rash  · Chest pain, fast or uneven heartbeat, trouble breathing  · Fever, chills, cough, sore throat, body aches  · Lightheadedness, dizziness, fainting  · Muscle spasms or twitching, numbness or tingling in your fingers, toes, or lips  · Pain or burning during urination, change in how much or how often you urinate  · Pain, swelling, heavy feeling, or numbness in your mouth or jaw, loose teeth or other teeth problems  · Severe bone, joint, or muscle pain  · Unusual pain in your thigh, groin, or hip  If you notice these less serious side effects, talk with your doctor:   · Diarrhea, nausea  · Redness, pain, itching, burning, swelling, or a lump under your skin where the shot was given  · Tiredness or weakness  If you notice other side effects that you think are caused by this medicine, tell your doctor. Call your doctor for medical advice about side effects. You may report side effects to FDA at 6-522-CQT-9887  © 2017 [facility] Information is for End User's use only and may not be sold, redistributed or otherwise used for commercial purposes. The above information is an  only. It is not intended as medical advice for individual conditions or treatments. Talk to your doctor, nurse or pharmacist before following any medical regimen to see if it is safe and effective for you.

## 2019-05-31 ENCOUNTER — TELEPHONE (OUTPATIENT)
Dept: PRIMARY CARE CLINIC | Age: 59
End: 2019-05-31

## 2019-05-31 LAB
HCV GENOTYPE: NORMAL
HCV GENTYP SERPL NAA+PROBE: NORMAL
HCV GENTYP SERPL NAA+PROBE: NORMAL
HCV RNA SERPL NAA+PROBE-ACNC: NORMAL IU/ML
HCV RNA SERPL NAA+PROBE-ACNC: NORMAL IU/ML
HCV RNA SERPL NAA+PROBE-LOG IU: 7.54 LOG10 IU/ML
PLEASE NOTE, 550474: NORMAL
TEST INFORMATION: NORMAL

## 2019-05-31 NOTE — PROGRESS NOTES
Please call patient:    Patient needs to follow-up with gastro for further evaluation of her positive hep C antibody results.

## 2019-05-31 NOTE — TELEPHONE ENCOUNTER
----- Message from Jordan Fong NP sent at 8/84/7432  9:56 AM EDT -----  Please call patient:    Patient needs to follow-up with gastro for further evaluation of her positive hep C antibody results.

## 2019-06-07 ENCOUNTER — OFFICE VISIT (OUTPATIENT)
Dept: OBGYN CLINIC | Age: 59
End: 2019-06-07

## 2019-06-07 VITALS
BODY MASS INDEX: 24.11 KG/M2 | WEIGHT: 131 LBS | SYSTOLIC BLOOD PRESSURE: 116 MMHG | DIASTOLIC BLOOD PRESSURE: 82 MMHG | HEIGHT: 62 IN

## 2019-06-07 DIAGNOSIS — Z11.51 SPECIAL SCREENING EXAMINATION FOR HUMAN PAPILLOMAVIRUS (HPV): ICD-10-CM

## 2019-06-07 DIAGNOSIS — Z01.419 WELL WOMAN EXAM: Primary | ICD-10-CM

## 2019-06-07 NOTE — PATIENT INSTRUCTIONS

## 2019-06-07 NOTE — PROGRESS NOTES
Annual exam    Kimberly Mast is a 60 y.o.  A0 presenting for annual exam. Doing well. Pt is [place], does NOT speak [place].  phones offered, but pt prefers to have her daughter and friend Kuldip Little interpret (they met at [facility], and Kuldip Little is helping them to establish medical care and health maintenance). Pt has never had a pap smear or colonoscopy. Just recently had her first mammogram (wnl) and dexa scan (which showed osteoporosis). She is scheduled to have injections (?prolia) to help with bone density. Also with recent diagnosis of hepatitis C by PCP. Ob/Gyn Hx:   A0 - 6 vaginal deliveries, 1 passed as an infant  Menopause- age 47  ? PMB- denies  ? VMS- night sweats  ? Vag dryness- denies  ? HRT-denies  STI-denies   ? SA-not currently    Health maintenance:  Pap- denies  Mammo-19 B1  Colonoscopy- denies  Dexa- 19 osteoporosis    Past Medical History:   Diagnosis Date    GERD (gastroesophageal reflux disease)     Hepatitis C     Hypertension     Osteoporosis        Past Surgical History:   Procedure Laterality Date    HX GI      part of colon removed       Family History   Problem Relation Age of Onset    Other Mother         brain aneurysm    Hypertension Mother     No Known Problems Father     Colon Cancer Sister         age 46s       Social History     Socioeconomic History    Marital status:      Spouse name: Not on file    Number of children: Not on file    Years of education: Not on file    Highest education level: Not on file   Occupational History    Not on file   Social Needs    Financial resource strain: Not on file    Food insecurity:     Worry: Not on file     Inability: Not on file    Transportation needs:     Medical: Not on file     Non-medical: Not on file   Tobacco Use    Smoking status: Never Smoker    Smokeless tobacco: Never Used   Substance and Sexual Activity    Alcohol use: Never     Frequency: Never    Drug use: Never    Sexual activity: Not Currently   Lifestyle    Physical activity:     Days per week: Not on file     Minutes per session: Not on file    Stress: Not on file   Relationships    Social connections:     Talks on phone: Not on file     Gets together: Not on file     Attends Alevism service: Not on file     Active member of club or organization: Not on file     Attends meetings of clubs or organizations: Not on file     Relationship status: Not on file    Intimate partner violence:     Fear of current or ex partner: Not on file     Emotionally abused: Not on file     Physically abused: Not on file     Forced sexual activity: Not on file   Other Topics Concern    Not on file   Social History Narrative    Not on file       Current Outpatient Medications   Medication Sig Dispense Refill    amLODIPine (NORVASC) 10 mg tablet Take 1 Tab by mouth daily. 90 Tab 0    raNITIdine (ZANTAC) 150 mg tablet Take 150 mg by mouth as needed for Indigestion.  simethicone (GAS-X EXTRA STRENGTH) 125 mg chewable tablet Take 125 mg by mouth every six (6) hours as needed for Flatulence.          No Known Allergies    Review of Systems - History obtained from the patient  Constitutional: negative for weight loss, fever, +night sweats/hot flashes  HEENT: negative for hearing loss, earache, congestion, snoring, sorethroat  CV: negative for chest pain, palpitations, edema  Resp: negative for cough, shortness of breath, wheezing  GI: negative for change in bowel habits, abdominal pain, black or bloody stools  : negative for frequency, dysuria, hematuria, vaginal discharge  MSK: negative for back pain, joint pain, muscle pain  Breast: negative for breast lumps, nipple discharge, galactorrhea  Skin :negative for itching, rash, hives  Neuro: negative for dizziness, headache, confusion, weakness  Psych: negative for anxiety, depression, change in mood  Heme/lymph: negative for bleeding, bruising, pallor    Physical Exam    Visit Vitals  BP 116/82 (BP 1 Location: Left arm, BP Patient Position: Sitting)   Ht 5' 2\" (1.575 m)   Wt 131 lb (59.4 kg)   BMI 23.96 kg/m²       Constitutional  · Appearance: well-nourished, well developed, alert, in no acute distress    HENT  · Head and Face: appears normal    Neck  · Inspection/Palpation: normal appearance, no masses or tenderness  · Lymph Nodes: no lymphadenopathy present  · Thyroid: gland size normal, nontender, no nodules or masses present on palpation    Chest  · Respiratory Effort: non-labored breathing  · Auscultation: CTAB, normal breath sounds    Cardiovascular  · Heart:  · Auscultation: regular rate and rhythm without murmur  · Extremities: no peripheral edema    Breasts  · Inspection of Breasts: breasts symmetrical, no skin changes, no discharge present, nipple appearance normal, no skin retraction present  · Palpation of Breasts and Axillae: no masses present on palpation, no breast tenderness  · Axillary Lymph Nodes: no lymphadenopathy present    Gastrointestinal  · Abdominal Examination: abdomen non-tender to palpation, normal bowel sounds, no masses present  · Liver and spleen: no hepatomegaly present, spleen not palpable  · Hernias: no hernias identified    Genitourinary  · External Genitalia: normal appearance for age, no discharge present, no tenderness present, no inflammatory lesions present, no masses present, +atrophy of UG mucosa  · Vagina: normal vaginal vault without central or paravaginal defects, no discharge present, no inflammatory lesions present, no masses present  · Bladder: non-tender to palpation  · Urethra: appears normal  · Cervix: normal   · Uterus: normal size, shape and consistency, small, mobile  · Adnexa: no adnexal tenderness present, no adnexal masses present  · Perineum: perineum within normal limits, no evidence of trauma, no rashes or skin lesions present    Skin  · General Inspection: no rash, no lesions identified    Neurologic/Psychiatric  · Mental Status:  · Orientation: grossly oriented to person, place and time  · Mood and Affect: mood normal, affect appropriate      Assessment/Plan:  60 y.o. postmenopausal female presenting for annual exam. Overall doing well.      Health Maintenance:  -counseled re: diet, exercise, healthy lifestyle  -pap/HPV today  -declines STI testing  -repeat mammo 1 year  -refer to GI for evaluation of HepC and for screening colonoscopy  -dexa - osteoporosis, encouraged Ca, Vit D, weight bearing exercise and follow up with PCP (?prolia injections planned)    RTC: 1 year for AE or sooner prbirdie Murphy MD  6/7/2019  1:50 PM

## 2019-06-11 LAB
CYTOLOGIST CVX/VAG CYTO: NORMAL
CYTOLOGY CVX/VAG DOC CYTO: NORMAL
CYTOLOGY CVX/VAG DOC THIN PREP: NORMAL
DX ICD CODE: NORMAL
HPV I/H RISK 1 DNA CVX QL PROBE+SIG AMP: NEGATIVE
Lab: NORMAL
OTHER STN SPEC: NORMAL
STAT OF ADQ CVX/VAG CYTO-IMP: NORMAL

## 2019-06-26 ENCOUNTER — OFFICE VISIT (OUTPATIENT)
Dept: PRIMARY CARE CLINIC | Age: 59
End: 2019-06-26

## 2019-06-26 VITALS
BODY MASS INDEX: 24.03 KG/M2 | DIASTOLIC BLOOD PRESSURE: 69 MMHG | HEART RATE: 79 BPM | TEMPERATURE: 97.9 F | SYSTOLIC BLOOD PRESSURE: 103 MMHG | WEIGHT: 130.6 LBS | HEIGHT: 62 IN | OXYGEN SATURATION: 99 % | RESPIRATION RATE: 16 BRPM

## 2019-06-26 DIAGNOSIS — I10 ESSENTIAL HYPERTENSION: Primary | ICD-10-CM

## 2019-06-26 DIAGNOSIS — M81.0 AGE-RELATED OSTEOPOROSIS WITHOUT CURRENT PATHOLOGICAL FRACTURE: ICD-10-CM

## 2019-06-26 RX ORDER — AMLODIPINE BESYLATE 5 MG/1
5 TABLET ORAL DAILY
Qty: 30 TAB | Refills: 1 | Status: SHIPPED | OUTPATIENT
Start: 2019-06-26 | End: 2019-07-24 | Stop reason: SDUPTHER

## 2019-06-26 NOTE — PROGRESS NOTES
HPI:     Chief Complaint   Patient presents with    Blood Pressure Check    Dizziness     patient reports feeling fatigue, headache and dizzy xfew days ago        Patient is a 60 y.o. female with significant history of HTN, GERD, osteoporosis who presents with her daughter for blood pressure check. She declines interpretor services, perefers to have daughter translate for her. Patient has been compliant with amlodipine 10mg daily. She is tolerating med well and denies side effects. She does however complain of intermittent dizziness and headache over the past 3-4 days. Has not been checking BP at home. Dizziness lasts for few seconds and goes away, has noticed with position changes. Denies hearing loss, tinnitus, nausea, vomiting, photophobia,.confusion, chest pain, palpitations, dyspnea, blurred vision, numbness or weakness of extremities, fevers. She does not smoke or drink alcohol. Patient follows with neurology Dr. Khris Em and has upcoming MRI July 1. Patient states that she did follow-up with liver specialist last week. I have not received report yet. Patient was started on prolia for osteoporosis, but has not heard from infusion center yet. Patient Active Problem List   Diagnosis Code    Essential hypertension I10    Gastroesophageal reflux disease without esophagitis K21.9    Age-related osteoporosis without current pathological fracture M81.0     Current Outpatient Medications   Medication Sig Dispense Refill    amLODIPine (NORVASC) 5 mg tablet Take 1 Tab by mouth daily. 30 Tab 1    raNITIdine (ZANTAC) 150 mg tablet Take 150 mg by mouth as needed for Indigestion.  simethicone (GAS-X EXTRA STRENGTH) 125 mg chewable tablet Take 125 mg by mouth every six (6) hours as needed for Flatulence.        No Known Allergies  Past Medical History:   Diagnosis Date    GERD (gastroesophageal reflux disease)     Hepatitis C     Hypertension     Osteoporosis           ROS: Pertinent items are noted in HPI. Objective:     Vitals:    06/26/19 1244 06/26/19 1325   BP: 104/68 103/69   Pulse: 79    Resp: 16    Temp: 97.9 °F (36.6 °C)    TempSrc: Oral    SpO2: 99%    Weight: 130 lb 9.6 oz (59.2 kg)    Height: 5' 2\" (1.575 m)         Vitals and Nurse Documentation reviewed. Physical Examination:   General appearance - alert, well appearing, and in no distress  Mental status - alert, oriented to person, place, and time, normal mood, behavior, speech, dress, motor activity, and thought processes  Eyes - pupils equal and reactive, extraocular eye movements intact  Ears - bilateral TM's and external ear canals normal  Nose - normal and patent, no erythema, discharge or polyps  Mouth - mucous membranes moist, pharynx normal without lesions  Neck - supple, no significant adenopathy  Chest - clear to auscultation, no wheezes, rales or rhonchi, symmetric air entry  Heart - normal rate, regular rhythm, normal S1, S2, no murmurs, rubs, clicks or gallops  Neurological - alert, oriented, normal speech, no focal findings or movement disorder noted, neck supple without rigidity, cranial nerves II through XII intact, motor and sensory grossly normal bilaterally  Extremities - peripheral pulses normal, no pedal edema, no clubbing or cyanosis      Assessment/ Plan:   Diagnoses and all orders for this visit:    1. Essential hypertension  -     BP today is on lower side. Discussed that dizziness could be related to low BP and that headache could be med side effect. We will decrease dose to 5mg and see how patient does. She is to monitor BP at home and follow-up if persistently >140/90s or if she remains symptomatic.    -    Start amLODIPine (NORVASC) 5 mg tablet;  Take 1 Tab by mouth daily.    -    Discussed fall prevent, adequate hydration, signs and symptoms of orthostasis, DASH diet, sodium reduction, exercise, and avoidance of caffeine    2. Age-related osteoporosis without current pathological fracture        -     Started on Prolia, but has not heard from infusion center. Will look into this. Follow-up and Dispositions    · Return in about 1 month (around 7/24/2019) for blood pressure check . I have discussed the diagnosis with the patient and the intended plan as seen in the above orders. Advised prompt follow-up if symptoms worsen or fail to improve and symptoms that would warrant emergent evaluation in ED. The patient has received an after-visit summary and questions were answered concerning future plans. I have discussed medication side effects and warnings with the patient as well. Patient expressed understanding and is in agreement with the diagnosis and plan.

## 2019-06-26 NOTE — PATIENT INSTRUCTIONS
Learning About High Blood Pressure  What is high blood pressure? Blood pressure is a measure of how hard the blood pushes against the walls of your arteries. It's normal for blood pressure to go up and down throughout the day, but if it stays up, you have high blood pressure. Another name for high blood pressure is hypertension. Two numbers tell you your blood pressure. The first number is the systolic pressure. It shows how hard the blood pushes when your heart is pumping. The second number is the diastolic pressure. It shows how hard the blood pushes between heartbeats, when your heart is relaxed and filling with blood. Your doctor will give you a goal for your blood pressure. Your goal will be based on your health and your age. High blood pressure (hypertension) means that the top number stays high, or the bottom number stays high, or both. High blood pressure increases the risk of stroke, heart attack, and other problems. You and your doctor will talk about your risks of these problems based on your blood pressure. What happens when you have high blood pressure? · Blood flows through your arteries with too much force. Over time, this damages the walls of your arteries. But you can't feel it. High blood pressure usually doesn't cause symptoms. · Fat and calcium start to build up in your arteries. This buildup is called plaque. Plaque makes your arteries narrower and stiffer. Blood can't flow through them as easily. · This lack of good blood flow starts to damage some of the organs in your body. This can lead to problems such as coronary artery disease and heart attack, heart failure, stroke, kidney failure, and eye damage. How can you prevent high blood pressure? · Stay at a healthy weight. · Try to limit how much sodium you eat to less than 2,300 milligrams (mg) a day. If you limit your sodium to 1,500 mg a day, you can lower your blood pressure even more.   ? Buy foods that are labeled \"unsalted,\" \"sodium-free,\" or \"low-sodium. \" Foods labeled \"reduced-sodium\" and \"light sodium\" may still have too much sodium. ? Flavor your food with garlic, lemon juice, onion, vinegar, herbs, and spices instead of salt. Do not use soy sauce, steak sauce, onion salt, garlic salt, mustard, or ketchup on your food. ? Use less salt (or none) when recipes call for it. You can often use half the salt a recipe calls for without losing flavor. · Be physically active. Get at least 30 minutes of exercise on most days of the week. Walking is a good choice. You also may want to do other activities, such as running, swimming, cycling, or playing tennis or team sports. · Limit alcohol to 2 drinks a day for men and 1 drink a day for women. · Eat plenty of fruits, vegetables, and low-fat dairy products. Eat less saturated and total fats. How is high blood pressure treated? · Your doctor will suggest making lifestyle changes. For example, your doctor may ask you to eat healthy foods, quit smoking, lose extra weight, and be more active. · If lifestyle changes don't help enough, your doctor may recommend that you take medicine. · When blood pressure is very high, medicines are needed to lower it. Follow-up care is a key part of your treatment and safety. Be sure to make and go to all appointments, and call your doctor if you are having problems. It's also a good idea to know your test results and keep a list of the medicines you take. Where can you learn more? Go to http://oksana-damián.info/. Enter P501 in the search box to learn more about \"Learning About High Blood Pressure. \"  Current as of: July 22, 2018  Content Version: 11.9  © 2350-5927 [employer]. Care instructions adapted under license by [employer] (which disclaims liability or warranty for this information).  If you have questions about a medical condition or this instruction, always ask your healthcare professional. [employer] disclaims any warranty or liability for your use of this information.

## 2019-06-26 NOTE — PROGRESS NOTES
Chief Complaint   Patient presents with    Blood Pressure Check    Dizziness     patient reports feeling fatigue, headache and dizzy xfew days ago     1. Have you been to the ER, urgent care clinic since your last visit? Hospitalized since your last visit? No    2. Have you seen or consulted any other health care providers outside of the [address] [place] [address] since your last visit? Include any pap smears or colon screening.  No

## 2019-07-01 ENCOUNTER — HOSPITAL ENCOUNTER (OUTPATIENT)
Dept: MRI IMAGING | Age: 59
Discharge: HOME | End: 2019-07-01
Attending: PSYCHIATRY & NEUROLOGY
Payer: COMMERCIAL

## 2019-07-01 VITALS — BODY MASS INDEX: 23.78 KG/M2 | WEIGHT: 130 LBS

## 2019-07-01 DIAGNOSIS — G24.5 BLEPHAROSPASM: ICD-10-CM

## 2019-07-01 DIAGNOSIS — G51.39 HEMIFACIAL SPASM: ICD-10-CM

## 2019-07-01 PROCEDURE — A9575 INJ GADOTERATE MEGLUMI 0.1ML: HCPCS | Performed by: PSYCHIATRY & NEUROLOGY

## 2019-07-01 PROCEDURE — 70553 MRI BRAIN STEM W/O & W/DYE: CPT

## 2019-07-01 PROCEDURE — 74011250636 HC RX REV CODE- 250/636: Performed by: PSYCHIATRY & NEUROLOGY

## 2019-07-01 RX ORDER — GADOTERATE MEGLUMINE 376.9 MG/ML
12 INJECTION INTRAVENOUS ONCE
Status: COMPLETED | OUTPATIENT
Start: 2019-07-01 | End: 2019-07-01

## 2019-07-01 RX ADMIN — GADOTERATE MEGLUMINE 12 ML: 376.9 INJECTION INTRAVENOUS at 09:52

## 2019-07-11 ENCOUNTER — TELEPHONE (OUTPATIENT)
Dept: NEUROLOGY | Age: 59
End: 2019-07-11

## 2019-07-15 ENCOUNTER — TELEPHONE (OUTPATIENT)
Dept: NEUROLOGY | Age: 59
End: 2019-07-15

## 2019-07-24 ENCOUNTER — OFFICE VISIT (OUTPATIENT)
Dept: PRIMARY CARE CLINIC | Age: 59
End: 2019-07-24

## 2019-07-24 VITALS
BODY MASS INDEX: 24.07 KG/M2 | SYSTOLIC BLOOD PRESSURE: 110 MMHG | TEMPERATURE: 98.4 F | RESPIRATION RATE: 17 BRPM | HEIGHT: 62 IN | OXYGEN SATURATION: 97 % | HEART RATE: 87 BPM | DIASTOLIC BLOOD PRESSURE: 75 MMHG | WEIGHT: 130.8 LBS

## 2019-07-24 DIAGNOSIS — M81.0 AGE-RELATED OSTEOPOROSIS WITHOUT CURRENT PATHOLOGICAL FRACTURE: ICD-10-CM

## 2019-07-24 DIAGNOSIS — I10 ESSENTIAL HYPERTENSION: Primary | ICD-10-CM

## 2019-07-24 RX ORDER — AMLODIPINE BESYLATE 5 MG/1
5 TABLET ORAL DAILY
Qty: 30 TAB | Refills: 2 | Status: SHIPPED | OUTPATIENT
Start: 2019-07-24 | End: 2019-12-23

## 2019-07-24 NOTE — PROGRESS NOTES
HPI:     Chief Complaint   Patient presents with    Blood Pressure Check     1 month f/u        Patient is a 60 y.o. female with significant history of HTN, GERD, osteoporosis who presents with her daughter for HTN follow-up. She declines interpretor services, perefers to have daughter translate for her.      Patient was evaluated one month ago for blood pressure and BP was on lower side 104/68 and patient reported intermittent dizziness and headache. We reduced amlodipine to 5mg. Has been compliant with med and both dizziness and headache have resolved. Has been checking BP periodically at home, but did not bring record and does not remember results. BP today 110/75. She feels well and denies chest pain, palpitations, dyspnea, peripheral edema, lightheadedness, dizziness, headache, blurred vision, numbness or weakness of extremities. Patient was started on prolia back in May for osteoporosis, but has not started yet. Reports she has not heard from [facility] about setting up appointment. Patient has upcoming screening colonoscopy 8/12/19. Patient Active Problem List   Diagnosis Code    Essential hypertension I10    Gastroesophageal reflux disease without esophagitis K21.9    Age-related osteoporosis without current pathological fracture M81.0     Current Outpatient Medications   Medication Sig Dispense Refill    amLODIPine (NORVASC) 5 mg tablet Take 1 Tab by mouth daily. 30 Tab 2    raNITIdine (ZANTAC) 150 mg tablet Take 150 mg by mouth as needed for Indigestion.  simethicone (GAS-X EXTRA STRENGTH) 125 mg chewable tablet Take 125 mg by mouth every six (6) hours as needed for Flatulence. No Known Allergies  Past Medical History:   Diagnosis Date    GERD (gastroesophageal reflux disease)     Hepatitis C     Hypertension     Osteoporosis           ROS:   Pertinent items are noted in HPI.       Objective:     Vitals:    07/24/19 0920 07/24/19 0946   BP: 97/64 110/75 Pulse: 87    Resp: 17    Temp: 98.4 °F (36.9 °C)    TempSrc: Oral    SpO2: 97%    Weight: 130 lb 12.8 oz (59.3 kg)    Height: 5' 2\" (1.575 m)         Vitals and Nurse Documentation reviewed. Physical Examination:   General appearance - alert, well appearing, and in no distress  Mental status - alert, oriented to person, place, and time, normal mood, behavior, speech, dress, motor activity, and thought processes  Eyes - pupils equal and reactive, extraocular eye movements intact  Chest - clear to auscultation, no wheezes, rales or rhonchi, symmetric air entry  Heart - normal rate, regular rhythm, normal S1, S2, no murmurs, rubs, clicks or gallops  Neurological - alert, oriented, normal speech, no focal findings or movement disorder noted  Extremities - peripheral pulses normal, no pedal edema, no clubbing or cyanosis      Assessment/ Plan:   Diagnoses and all orders for this visit:    1. Essential hypertension  -     BP is stable and patient is asymptomatic. Dizziness and headache have resolved with lowered amlodipine dose. Continue 5mg daily. Advised to monitor BP at home and follow-up if >140/90 or on lower side with dizziness.    -     Continue amLODIPine (NORVASC) 5 mg tablet; Take 1 Tab by mouth daily.  -     May consider stopping med if BP remains on lower side. Will continue to monitor.   -     Discussed fall prevention, adequate hydration, signs and symptoms of orthostasis, DASH diet, sodium reduction, exercise, and avoidance of caffeine    2. Age-related osteoporosis without current pathological fracture        -     Patient was started on Prolia in May, but has not heard from infusion center. Yolanda Beard is calling [facility] and will touch base with infusion center and we will let patient know. Follow-up and Dispositions    · Return in about 3 months (around 10/24/2019) for blood pressure check .          I have discussed the diagnosis with the patient and the intended plan as seen in the above orders. Advised prompt follow-up if symptoms worsen or fail to improve and symptoms that would warrant emergent evaluation in ED. The patient has received an after-visit summary and questions were answered concerning future plans. I have discussed medication side effects and warnings with the patient as well. Patient expressed understanding and is in agreement with the diagnosis and plan.

## 2019-07-24 NOTE — PATIENT INSTRUCTIONS
DASH Diet: Care Instructions  Your Care Instructions    The DASH diet is an eating plan that can help lower your blood pressure. DASH stands for Dietary Approaches to Stop Hypertension. Hypertension is high blood pressure. The DASH diet focuses on eating foods that are high in calcium, potassium, and magnesium. These nutrients can lower blood pressure. The foods that are highest in these nutrients are fruits, vegetables, low-fat dairy products, nuts, seeds, and legumes. But taking calcium, potassium, and magnesium supplements instead of eating foods that are high in those nutrients does not have the same effect. The DASH diet also includes whole grains, fish, and poultry. The DASH diet is one of several lifestyle changes your doctor may recommend to lower your high blood pressure. Your doctor may also want you to decrease the amount of sodium in your diet. Lowering sodium while following the DASH diet can lower blood pressure even further than just the DASH diet alone. Follow-up care is a key part of your treatment and safety. Be sure to make and go to all appointments, and call your doctor if you are having problems. It's also a good idea to know your test results and keep a list of the medicines you take. How can you care for yourself at home? Following the DASH diet  · Eat 4 to 5 servings of fruit each day. A serving is 1 medium-sized piece of fruit, ½ cup chopped or canned fruit, 1/4 cup dried fruit, or 4 ounces (½ cup) of fruit juice. Choose fruit more often than fruit juice. · Eat 4 to 5 servings of vegetables each day. A serving is 1 cup of lettuce or raw leafy vegetables, ½ cup of chopped or cooked vegetables, or 4 ounces (½ cup) of vegetable juice. Choose vegetables more often than vegetable juice. · Get 2 to 3 servings of low-fat and fat-free dairy each day. A serving is 8 ounces of milk, 1 cup of yogurt, or 1 ½ ounces of cheese. · Eat 6 to 8 servings of grains each day.  A serving is 1 slice of bread, 1 ounce of dry cereal, or ½ cup of cooked rice, pasta, or cooked cereal. Try to choose whole-grain products as much as possible. · Limit lean meat, poultry, and fish to 2 servings each day. A serving is 3 ounces, about the size of a deck of cards. · Eat 4 to 5 servings of nuts, seeds, and legumes (cooked dried beans, lentils, and split peas) each week. A serving is 1/3 cup of nuts, 2 tablespoons of seeds, or ½ cup of cooked beans or peas. · Limit fats and oils to 2 to 3 servings each day. A serving is 1 teaspoon of vegetable oil or 2 tablespoons of salad dressing. · Limit sweets and added sugars to 5 servings or less a week. A serving is 1 tablespoon jelly or jam, ½ cup sorbet, or 1 cup of lemonade. · Eat less than 2,300 milligrams (mg) of sodium a day. If you limit your sodium to 1,500 mg a day, you can lower your blood pressure even more. Tips for success  · Start small. Do not try to make dramatic changes to your diet all at once. You might feel that you are missing out on your favorite foods and then be more likely to not follow the plan. Make small changes, and stick with them. Once those changes become habit, add a few more changes. · Try some of the following:  ? Make it a goal to eat a fruit or vegetable at every meal and at snacks. This will make it easy to get the recommended amount of fruits and vegetables each day. ? Try yogurt topped with fruit and nuts for a snack or healthy dessert. ? Add lettuce, tomato, cucumber, and onion to sandwiches. ? Combine a ready-made pizza crust with low-fat mozzarella cheese and lots of vegetable toppings. Try using tomatoes, squash, spinach, broccoli, carrots, cauliflower, and onions. ? Have a variety of cut-up vegetables with a low-fat dip as an appetizer instead of chips and dip. ? Sprinkle sunflower seeds or chopped almonds over salads. Or try adding chopped walnuts or almonds to cooked vegetables.   ? Try some vegetarian meals using beans and peas. Add garbanzo or kidney beans to salads. Make burritos and tacos with mashed laurent beans or black beans. Where can you learn more? Go to http://oksana-damián.info/. Enter W537 in the search box to learn more about \"DASH Diet: Care Instructions. \"  Current as of: July 22, 2018  Content Version: 12.1  © 3580-5507 [employer]wise, [employer]. Care instructions adapted under license by [employer] (which disclaims liability or warranty for this information). If you have questions about a medical condition or this instruction, always ask your healthcare professional. [address] any warranty or liability for your use of this information.

## 2019-08-12 ENCOUNTER — ANESTHESIA (OUTPATIENT)
Dept: ENDOSCOPY | Age: 59
End: 2019-08-12
Payer: COMMERCIAL

## 2019-08-12 ENCOUNTER — ANESTHESIA EVENT (OUTPATIENT)
Dept: ENDOSCOPY | Age: 59
End: 2019-08-12
Payer: COMMERCIAL

## 2019-08-12 ENCOUNTER — HOSPITAL ENCOUNTER (OUTPATIENT)
Age: 59
Setting detail: OUTPATIENT SURGERY
Discharge: HOME | End: 2019-08-12
Attending: INTERNAL MEDICINE | Admitting: INTERNAL MEDICINE
Payer: COMMERCIAL

## 2019-08-12 VITALS
SYSTOLIC BLOOD PRESSURE: 92 MMHG | TEMPERATURE: 98.1 F | DIASTOLIC BLOOD PRESSURE: 60 MMHG | RESPIRATION RATE: 20 BRPM | WEIGHT: 128.2 LBS | OXYGEN SATURATION: 100 % | HEART RATE: 77 BPM | BODY MASS INDEX: 23.45 KG/M2

## 2019-08-12 PROCEDURE — 88305 TISSUE EXAM BY PATHOLOGIST: CPT

## 2019-08-12 PROCEDURE — 76040000019: Performed by: INTERNAL MEDICINE

## 2019-08-12 PROCEDURE — 76060000031 HC ANESTHESIA FIRST 0.5 HR: Performed by: INTERNAL MEDICINE

## 2019-08-12 PROCEDURE — 74011250636 HC RX REV CODE- 250/636: Performed by: NURSE ANESTHETIST, CERTIFIED REGISTERED

## 2019-08-12 PROCEDURE — 77030013992 HC SNR POLYP ENDOSC BSC -B: Performed by: INTERNAL MEDICINE

## 2019-08-12 RX ORDER — NALOXONE HYDROCHLORIDE 0.4 MG/ML
0.4 INJECTION, SOLUTION INTRAMUSCULAR; INTRAVENOUS; SUBCUTANEOUS
Status: DISCONTINUED | OUTPATIENT
Start: 2019-08-12 | End: 2019-08-12 | Stop reason: HOSPADM

## 2019-08-12 RX ORDER — FLUMAZENIL 0.1 MG/ML
0.2 INJECTION INTRAVENOUS
Status: DISCONTINUED | OUTPATIENT
Start: 2019-08-12 | End: 2019-08-12 | Stop reason: HOSPADM

## 2019-08-12 RX ORDER — DEXTROMETHORPHAN/PSEUDOEPHED 2.5-7.5/.8
1.2 DROPS ORAL
Status: DISCONTINUED | OUTPATIENT
Start: 2019-08-12 | End: 2019-08-12 | Stop reason: HOSPADM

## 2019-08-12 RX ORDER — SODIUM CHLORIDE 9 MG/ML
50 INJECTION, SOLUTION INTRAVENOUS CONTINUOUS
Status: DISCONTINUED | OUTPATIENT
Start: 2019-08-12 | End: 2019-08-12 | Stop reason: HOSPADM

## 2019-08-12 RX ORDER — LIDOCAINE HYDROCHLORIDE 20 MG/ML
INJECTION, SOLUTION EPIDURAL; INFILTRATION; INTRACAUDAL; PERINEURAL AS NEEDED
Status: DISCONTINUED | OUTPATIENT
Start: 2019-08-12 | End: 2019-08-12 | Stop reason: HOSPADM

## 2019-08-12 RX ORDER — SODIUM CHLORIDE 9 MG/ML
INJECTION, SOLUTION INTRAVENOUS
Status: DISCONTINUED | OUTPATIENT
Start: 2019-08-12 | End: 2019-08-12 | Stop reason: HOSPADM

## 2019-08-12 RX ORDER — PHENYLEPHRINE HCL IN 0.9% NACL 0.4MG/10ML
SYRINGE (ML) INTRAVENOUS AS NEEDED
Status: DISCONTINUED | OUTPATIENT
Start: 2019-08-12 | End: 2019-08-12 | Stop reason: HOSPADM

## 2019-08-12 RX ORDER — PROPOFOL 10 MG/ML
INJECTION, EMULSION INTRAVENOUS AS NEEDED
Status: DISCONTINUED | OUTPATIENT
Start: 2019-08-12 | End: 2019-08-12 | Stop reason: HOSPADM

## 2019-08-12 RX ORDER — SODIUM CHLORIDE 0.9 % (FLUSH) 0.9 %
5-40 SYRINGE (ML) INJECTION EVERY 8 HOURS
Status: DISCONTINUED | OUTPATIENT
Start: 2019-08-12 | End: 2019-08-12 | Stop reason: HOSPADM

## 2019-08-12 RX ORDER — EPINEPHRINE 0.1 MG/ML
1 INJECTION INTRACARDIAC; INTRAVENOUS
Status: DISCONTINUED | OUTPATIENT
Start: 2019-08-12 | End: 2019-08-12 | Stop reason: HOSPADM

## 2019-08-12 RX ORDER — UREA 10 %
100 LOTION (ML) TOPICAL DAILY
COMMUNITY

## 2019-08-12 RX ORDER — SODIUM CHLORIDE 0.9 % (FLUSH) 0.9 %
5-40 SYRINGE (ML) INJECTION AS NEEDED
Status: DISCONTINUED | OUTPATIENT
Start: 2019-08-12 | End: 2019-08-12 | Stop reason: HOSPADM

## 2019-08-12 RX ORDER — MIDAZOLAM HYDROCHLORIDE 1 MG/ML
.25-1 INJECTION, SOLUTION INTRAMUSCULAR; INTRAVENOUS
Status: DISCONTINUED | OUTPATIENT
Start: 2019-08-12 | End: 2019-08-12 | Stop reason: HOSPADM

## 2019-08-12 RX ORDER — BISMUTH SUBSALICYLATE 262 MG
1 TABLET,CHEWABLE ORAL DAILY
COMMUNITY

## 2019-08-12 RX ORDER — ATROPINE SULFATE 0.1 MG/ML
0.5 INJECTION INTRAVENOUS
Status: DISCONTINUED | OUTPATIENT
Start: 2019-08-12 | End: 2019-08-12 | Stop reason: HOSPADM

## 2019-08-12 RX ORDER — FENTANYL CITRATE 50 UG/ML
100 INJECTION, SOLUTION INTRAMUSCULAR; INTRAVENOUS
Status: DISCONTINUED | OUTPATIENT
Start: 2019-08-12 | End: 2019-08-12 | Stop reason: HOSPADM

## 2019-08-12 RX ADMIN — PROPOFOL 50 MG: 10 INJECTION, EMULSION INTRAVENOUS at 15:04

## 2019-08-12 RX ADMIN — PHENYLEPHRINE HYDROCHLORIDE 120 MCG: 10 INJECTION INTRAVENOUS at 15:04

## 2019-08-12 RX ADMIN — PHENYLEPHRINE HYDROCHLORIDE 120 MCG: 10 INJECTION INTRAVENOUS at 14:55

## 2019-08-12 RX ADMIN — PROPOFOL 40 MG: 10 INJECTION, EMULSION INTRAVENOUS at 14:52

## 2019-08-12 RX ADMIN — PROPOFOL 50 MG: 10 INJECTION, EMULSION INTRAVENOUS at 14:56

## 2019-08-12 RX ADMIN — PROPOFOL 70 MG: 10 INJECTION, EMULSION INTRAVENOUS at 14:47

## 2019-08-12 RX ADMIN — PROPOFOL 70 MG: 10 INJECTION, EMULSION INTRAVENOUS at 14:46

## 2019-08-12 RX ADMIN — LIDOCAINE HYDROCHLORIDE 40 MG: 20 INJECTION, SOLUTION EPIDURAL; INFILTRATION; INTRACAUDAL; PERINEURAL at 14:45

## 2019-08-12 RX ADMIN — PROPOFOL 50 MG: 10 INJECTION, EMULSION INTRAVENOUS at 14:57

## 2019-08-12 RX ADMIN — PHENYLEPHRINE HYDROCHLORIDE 80 MCG: 10 INJECTION INTRAVENOUS at 14:58

## 2019-08-12 RX ADMIN — PROPOFOL 40 MG: 10 INJECTION, EMULSION INTRAVENOUS at 14:49

## 2019-08-12 RX ADMIN — SODIUM CHLORIDE: 900 INJECTION, SOLUTION INTRAVENOUS at 14:41

## 2019-08-12 RX ADMIN — PROPOFOL 50 MG: 10 INJECTION, EMULSION INTRAVENOUS at 15:00

## 2019-08-12 RX ADMIN — PROPOFOL 50 MG: 10 INJECTION, EMULSION INTRAVENOUS at 14:50

## 2019-08-12 RX ADMIN — PROPOFOL 30 MG: 10 INJECTION, EMULSION INTRAVENOUS at 15:06

## 2019-08-12 RX ADMIN — PROPOFOL 50 MG: 10 INJECTION, EMULSION INTRAVENOUS at 14:44

## 2019-08-12 RX ADMIN — PHENYLEPHRINE HYDROCHLORIDE 120 MCG: 10 INJECTION INTRAVENOUS at 14:59

## 2019-08-12 NOTE — PROCEDURES
[facility].  [address] [facility], [address]  [phone]                              Colonoscopy Procedure Note      Indications:  Screening colonoscopy, first procedure (sister with history of colon cancer)    :  Dara Bullock MD    Referring Provider: Mitchell Bonilla NP    Sedation:  MAC anesthesia    Procedure Details:  After informed consent was obtained with all risks and benefits of procedure explained and preoperative exam completed, the patient was taken to the endoscopy suite and placed in the left lateral decubitus position. Upon sequential sedation as per above, a digital rectal exam was performed per below. The Olympus videocolonoscope was inserted in the rectum and carefully advanced to the terminal ileum. The quality of preparation was good. Starford Bowel Prep Score :3/3/3 . The colonoscope was slowly withdrawn with careful evaluation between folds. Retroflexion in the rectum was performed. Findings:   Rectum: normal  Sigmoid: One 14 mm pedunculated polyp, 25 cm from the anal verge, removed with hot snare. One 12 mm pedunculated polyp, 40 cm from the anal verge, removed with hot snare. Descending Colon: normal  Transverse Colon: normal  Ascending Colon: Two sessile polyps (2-7 mm), removed with cold snare. Three polyps (3-4 mm), removed with forceps  Cecum: normal  Terminal Ileum: normal    Interventions:  polypectomy    Specimen Removed:    ID Type Source Tests Collected by Time Destination   1 : Ascending Colon Polyp Preservative   Maggie Cervantes MD 8/12/2019 1452 Pathology   2 : Sigmoid Polyp at 36 CM Preservative   Maggie Cervantes MD 8/12/2019 1507 Pathology   3 : Sigmoid Polyp at 25 CM Preservative   Maggie Cervantes MD 8/12/2019 1507 Pathology       Complications: None. EBL:  Minimal    Impression:    See Postoperative diagnosis above    Recommendations:   - If biopsies were obtained, await pathology.  You should receive a letter within 2 weeks. - Resume normal medications. Avoid NSAIDs for 14 days.   - Recommend repeat colonoscopy in either 1 or 3 years pending pathology. Discharge Disposition:  Home in the company of a  when able to ambulate.     Filipe Vega MD  8/12/2019  3:10 PM

## 2019-08-12 NOTE — DISCHARGE INSTRUCTIONS
[address]  [address] [address] [address] [facility] [address]  [id number]  1960    It was my pleasure seeing you for your procedure. You will also receive a summary report with the findings from this procedure and any further recommendations. If you had polyps removed or biopsies taken during your procedure, you will receive a separate letter from me within the next 2 weeks. If you don't receive this letter or if you have any questions, please call my office [phone]. Please take note of the post procedure instructions listed below. Best Wishes,    Dr. Josiane Victor    These instructions give you information on caring for yourself after your procedure. Call your doctor if you have any problems or questions after your procedure. HOME CARE  · Walk if you have belly cramping or gas. Walking will help get rid of the air and reduce the bloated feeling in your belly (abdomen). · Your IV site (where you received drugs) may be tender to touch. Place warm towels on the site; keep your arm up on two pillows if you have any swelling or soreness in the area. · You may shower. ACTIVITY:  · Take frequent rest periods and move at a slower pace for the next 24 hours. .  · You may resume your regular activity tomorrow if you are feeling back to normal.  · Do not drive or ride a bicycle for at least 24 hours (because of the medicine (anesthesia) used during the test). · Do not sign any important legal documents or use or operate any machinery for 24 hours  · Do not take sleeping medicines/nerve drugs for 24 hours unless the doctor tells you. · You can return to work/school tomorrow unless otherwise instructed. NUTRITION:  · Drink plenty of fluids to keep your pee (urine) clear or pale yellow  · Begin with a light meal and progress to your normal diet.  Heavy or fried foods are harder to digest and may make you feel sick to your stomach (nauseated). · Once you are feeling back to normal, you may resume your normal diet as instructed by your doctor. · Avoid alcoholic beverages for 24 hours or as instructed. IF YOU HAD BIOPSIES TAKEN OR POLYPS REMOVED DURING THE PROCEDURE:  · For the next 7 days, avoid all non-steroidal antiinflammatory medications such as Ibuprofen, Motrin, Advil, Alleve, Tiarra-seltzer, Goody's powder, BC powder. · If you do not have an heart condition that requires you to take a daily aspirin, you should avoid taking aspirin for 7 days. · Eat a soft diet for 24 hours. · Monitor your stools for any blood or dark black, tar-like, stools as this may be a sign of bleeding and if you see any blood, notify your doctor immediately. GET HELP RIGHT AWAY AND SEEK IMMEDIATE MEDICAL CARE IF:  · You have more than a spotting of blood in your stool. · You pass clumps of tissue (blood clots) or fill the toilet with blood. · Your belly is painfully swollen or puffy (abdominal distention). · You throw up (vomit). · You have a fever. · You have redness, pain or swelling at the IV site that last greater than two days. · You have abdominal pain or discomfort that is severe or gets worse throughout the day. Post-procedure diagnosis:  1. - Colon Polyps    Findings:   Rectum: normal  Sigmoid: One 14 mm pedunculated polyp, 25 cm from the anal verge, removed with hot snare. One 12 mm pedunculated polyp, 40 cm from the anal verge, removed with hot snare. Descending Colon: normal  Transverse Colon: normal  Ascending Colon: Two sessile polyps (2-7 mm), removed with cold snare. Three polyps (3-4 mm), removed with forceps  Cecum: normal  Terminal Ileum: normal    Recommendations:   - Await pathology. You should receive a letter within 2 weeks. - Resume normal medications. Avoid NSAIDs for 14 days.   - Recommend repeat colonoscopy in either 1 or 3 years pending pathology.

## 2019-08-12 NOTE — H&P
118 [facility].  217 [facility] [address] [place] [address], [address]  [phone]                                History and Physical     NAME: Jayna Thurman   :  1960   MRN:  [id number]     HPI:  The patient was seen and examined. Past Surgical History:   Procedure Laterality Date    HX GI      part of colon removed     Past Medical History:   Diagnosis Date    GERD (gastroesophageal reflux disease)     Hepatitis C     Hypertension     Osteoporosis      Social History     Tobacco Use    Smoking status: Never Smoker    Smokeless tobacco: Never Used   Substance Use Topics    Alcohol use: Never     Frequency: Never    Drug use: Never     No Known Allergies  Family History   Problem Relation Age of Onset    Other Mother         brain aneurysm    Hypertension Mother     No Known Problems Father     Colon Cancer Sister         age 46s     No current facility-administered medications for this encounter. PHYSICAL EXAM:  General: WD, WN. Alert, cooperative, no acute distress    HEENT: NC, Atraumatic. PERRLA, EOMI. Anicteric sclerae. Lungs:  CTA Bilaterally. No Wheezing/Rhonchi/Rales. Heart:  Regular  rhythm,  No murmur, No Rubs, No Gallops  Abdomen: Soft, Non distended, Non tender.  +Bowel sounds, no HSM  Extremities: No c/c/e  Neurologic:  CN 2-12 gi, Alert and oriented X 3. No acute neurological distress   Psych:   Good insight. Not anxious nor agitated. The heart, lungs and mental status were satisfactory for the administration of MAC sedation and for the procedure.       Mallampati score: 2       Assessment:   · Screening, colonoscopy    Plan:   · Endoscopic procedure :cscope  · MAC sedation

## 2019-08-12 NOTE — ROUTINE PROCESS
[address] [facility] [address]  1960  [id number]    Situation:  Verbal report received from: Mina Loya  Procedure: Procedure(s):  COLONOSCOPY  ENDOSCOPIC POLYPECTOMY    Background:    Preoperative diagnosis: SCREENING FOR MALIGNANT NEOPLASMS OF COLON, CHRONIC HEPATITIS  Postoperative diagnosis: 1. - Colon Polyps    :  Dr. Pao Joy  Assistant(s): Endoscopy Technician-1: Clari Conner  Endoscopy RN-1: Faustina Giordano RN    Specimens:   ID Type Source Tests Collected by Time Destination   1 : Ascending Colon Polyp Preservative   Amanda Gomes MD 8/12/2019 1452 Pathology   2 : Sigmoid Polyp at 36 CM Preservative   Amanda Gomes MD 8/12/2019 1507 Pathology   3 : Sigmoid Polyp at 22 CM Preservative   Amanda Gomes MD 8/12/2019 1507 Pathology     H. Pylori  no    Assessment:  Intra-procedure medications     Anesthesia gave intra-procedure sedation and medications, see anesthesia flow sheet yes    Intravenous fluids: NS@ KVO     Vital signs stable     Abdominal assessment: round and soft     Recommendation:  Discharge patient per MD order.     Family or Friend   Permission to share finding with family or friend yes

## 2019-08-12 NOTE — ANESTHESIA PREPROCEDURE EVALUATION
Relevant Problems   No relevant active problems       Anesthetic History   No history of anesthetic complications            Review of Systems / Medical History  Patient summary reviewed, nursing notes reviewed and pertinent labs reviewed    Pulmonary  Within defined limits                 Neuro/Psych   Within defined limits           Cardiovascular  Within defined limits  Hypertension                   GI/Hepatic/Renal  Within defined limits   GERD  Hepatitis: type C         Endo/Other  Within defined limits           Other Findings              Physical Exam    Airway  Mallampati: II  TM Distance: > 6 cm  Neck ROM: normal range of motion   Mouth opening: Normal     Cardiovascular  Regular rate and rhythm,  S1 and S2 normal,  no murmur, click, rub, or gallop             Dental    Dentition: Poor dentition     Pulmonary  Breath sounds clear to auscultation               Abdominal  GI exam deferred       Other Findings            Anesthetic Plan    ASA: 3  Anesthesia type: MAC            Anesthetic plan and risks discussed with: Patient

## 2019-08-12 NOTE — PROGRESS NOTES

## 2019-08-15 ENCOUNTER — TELEPHONE (OUTPATIENT)
Dept: PRIMARY CARE CLINIC | Age: 59
End: 2019-08-15

## 2019-08-15 NOTE — TELEPHONE ENCOUNTER
Patient is calling to find out where she needs to go on next Monday. She has an appt for a bone shot? Please call the patient back.  Thanks

## 2019-08-15 NOTE — TELEPHONE ENCOUNTER
I spoke with Ms. Hernández, and gave her the information for [facility], address and phone number were provided. [address] [address] [address]     [phone]     Patient states she will call them to see what time she is supposed to be there and thanked me for the call.

## 2019-08-19 ENCOUNTER — HOSPITAL ENCOUNTER (OUTPATIENT)
Dept: INFUSION THERAPY | Age: 59
Discharge: HOME | End: 2019-08-19

## 2019-08-26 ENCOUNTER — OFFICE VISIT (OUTPATIENT)
Dept: HEMATOLOGY | Age: 59
End: 2019-08-26

## 2019-08-26 VITALS
WEIGHT: 128.8 LBS | TEMPERATURE: 98.6 F | OXYGEN SATURATION: 99 % | BODY MASS INDEX: 23.56 KG/M2 | HEART RATE: 84 BPM | DIASTOLIC BLOOD PRESSURE: 60 MMHG | SYSTOLIC BLOOD PRESSURE: 106 MMHG

## 2019-08-26 DIAGNOSIS — B18.2 CHRONIC HEPATITIS C WITHOUT HEPATIC COMA (HCC): Primary | ICD-10-CM

## 2019-08-26 NOTE — PROGRESS NOTES
1. Have you been to the ER, urgent care clinic since your last visit? Hospitalized since your last visit? No    2. Have you seen or consulted any other health care providers outside of the [address] [place] [address] since your last visit? Include any pap smears or colon screening. No     Chief Complaint   Patient presents with    New Patient     Hep C     Visit Vitals  /60 (BP 1 Location: Left arm, BP Patient Position: Sitting)   Pulse 84   Temp 98.6 °F (37 °C) (Tympanic)   Wt 128 lb 12.8 oz (58.4 kg)   SpO2 99%   BMI 23.56 kg/m²     3 most recent PHQ Screens 8/26/2019   Little interest or pleasure in doing things Not at all   Feeling down, depressed, irritable, or hopeless Not at all   Total Score PHQ 2 0     Abuse Screening Questionnaire 8/26/2019   Do you ever feel afraid of your partner? N   Are you in a relationship with someone who physically or mentally threatens you? N   Is it safe for you to go home?  Y     Learning Assessment 8/26/2019   PRIMARY LEARNER Patient   HIGHEST LEVEL OF EDUCATION - PRIMARY LEARNER  DID NOT GRADUATE HIGH SCHOOL   BARRIERS PRIMARY LEARNER LANGUAGE   CO-LEARNER CAREGIVER Yes   CO-LEARNER NAME daughter   PRIMARY LANGUAGE OTHER (COMMENT)   LEARNER PREFERENCE PRIMARY DEMONSTRATION     -   ANSWERED BY patient   RELATIONSHIP SELF

## 2019-08-26 NOTE — PROGRESS NOTES
[address] [facility], Cassidy QUIJANO Garold Denier, MD Allen Hoose, PA-C Reymundo Furlong, [facility]     April S Christy, [facility]   Giovanni Mccabe P-BETTY Carlin, [facility]       [facility] [address]    at [address], [address] [place]  22.    [phone]    FAX: [address] [place] [address], [address]    [phone]    FAX: [phone]     Patient Care Team:  Willy Michaud NP as PCP - General (Nurse Practitioner)  Keith Spring MD (Obstetrics & Gynecology)  Kenzie Villasenor MD (Gastroenterology)    Problem List  Date Reviewed: 7/24/2019          Codes Class Noted    Chronic hepatitis C without hepatic coma [facility]) ICD-10-CM: B18.2  ICD-9-CM: 070.54  10/7/2019        Age-related osteoporosis without current pathological fracture ICD-10-CM: M81.0  ICD-9-CM: 733.01  5/20/2019        Essential hypertension ICD-10-CM: I10  ICD-9-CM: 401.9  4/15/2019        Gastroesophageal reflux disease without esophagitis ICD-10-CM: K21.9  ICD-9-CM: 530.81  4/15/2019            The clinicians listed above have asked me to see Danny Hernández in consultation regarding chronic HCV and its management. All medical records sent by the referring physicians were reviewed. The patient is a 60 y.o.  female who was screened for anti-HCV and tested positive. Her daughter is with her to translate. Risk factor for acquiring HCV is immigration from [place]. An ultrasound demonstrated a normal appearing liver. An assessment of liver fibrosis with biopsy or elastography has not been performed. The patient has never received treatment for chronic HCV.       The patient has no symptoms which can be attributed to the liver disorder. The patient has not experienced the following symptoms: pain in the right side over the liver, yellowing of the eyes or skin or problems concentrating. The patient completes all daily activities without any functional limitations. ASSESSMENT AND PLAN:  Chronic HCV   Chronic HCV of unclear severity. Liver transaminases are normal. ALP is normal. Liver function is normal. Total bilirubin is normal. Serum albumin is normal. The platelet count is   normal.      Based upon laboratory studies, the patient does not appear to have advanced liver disease. Will perform laboratory testing to monitor liver function and degree of liver injury. This included BMP, hepatic panel, CBC with platelet count and INR. Will perform and/or review results of HCV viral load and HCV genotype   to define the specific treatment and duration of treatment that will be required. Will perform serologic and virologic studies to assess for other causes of chronic liver disease. The degree of fibrosis will be assessed by FibrosSure. Chronic HCV Treatment  The patient has not been treated for HCV. The patient has HCV genotype that is not yet defined. It was unable to be determined in previous genotype testing. Discussed the treatment alternatives. The SVR/cure rate for HCV now exceeds 97% without significant side effects for most patients with HCV. The specific treatment is dependent upon genotype, viral load and histology. The patient should be treated with Mavyret due to pan genotypical coverage. Screening for hepatocellular carcinoma  HCC screening is not necessary if the patient has no evidence of cirrhosis. Treatment of other medical problems in patients with chronic liver disease  There are no contraindications for the patient to take most medications necessary for treatment of other medical issues. The patient can take:   Any medications utilized for treatment of DM  Statins to treat hypercholesterolemia    The patient does not consume alcohol on a daily basis. Counseling for alcohol in patients with chronic liver disease  The patient was counseled regarding alcohol consumption and the effect of alcohol on chronic liver disease. The patient does not consume any significant amount of alcohol. Vaccinations   Vaccination for viral hepatitis A and B is not needed. The patient has serologic evidence of prior exposure or vaccination with immunity. Routine vaccinations against other bacterial and viral agents can be performed as indicated. Annual flu vaccination should be administered if indicated. ALLERGIES  No Known Allergies    MEDICATIONS  Current Outpatient Medications   Medication Sig    glecaprevir-pibrentasvir (MAVYRET) 100-40 mg tab Take 3 Tabs by mouth daily. Indications: GT unable to be determined. Non-cirrhotic.  multivitamin (ONE A DAY) tablet Take 1 Tab by mouth daily.  cyanocobalamin (VITAMIN B-12) 100 mcg tablet Take 100 mcg by mouth daily. Indications: unsure of dose    OTHER Indications: vitamin from [facility]    amLODIPine (NORVASC) 5 mg tablet Take 1 Tab by mouth daily.  raNITIdine (ZANTAC) 150 mg tablet Take 150 mg by mouth as needed for Indigestion.  simethicone (GAS-X EXTRA STRENGTH) 125 mg chewable tablet Take 125 mg by mouth every six (6) hours as needed for Flatulence. No current facility-administered medications for this visit. SYSTEM REVIEW NOT RELATED TO LIVER DISEASE OR REVIEWED ABOVE:  Constitution systems: Negative for fever, chills, weight gain, weight loss. Eyes: Negative for visual changes. ENT: Negative for sore throat, painful swallowing. Respiratory: Negative for cough, hemoptysis, SOB. Cardiology: Negative for chest pain, palpitations. GI:  Negative for constipation or diarrhea. : Negative for urinary frequency, dysuria, hematuria, nocturia. Skin: Negative for rash.   Hematology: Negative for easy bruising, blood clots. Musculo-skeletal: Negative for back pain, muscle pain, weakness. Neurologic: Negative for headaches, dizziness, vertigo, memory problems not related to HE. Psychology: Negative for anxiety, depression. FAMILY HISTORY:  There is no family history of liver disease. There is no family history of immune disorders. SOCIAL HISTORY:  The patient is . The patient has never used tobacco products. The patient has never consumed significant amounts of alcohol. The patient currently works full time. PHYSICAL EXAMINATION:  Visit Vitals  /60 (BP 1 Location: Left arm, BP Patient Position: Sitting)   Pulse 84   Temp 98.6 °F (37 °C) (Tympanic)   Wt 128 lb 12.8 oz (58.4 kg)   SpO2 99%   BMI 23.56 kg/m²     General: No acute distress. Eyes: Sclera anicteric. ENT: No oral lesions. Nodes: No adenopathy. Skin: No spider angiomata. No jaundice. No palmar erythema. Respiratory: Lungs clear to auscultation. Cardiovascular: Regular heart rate. No murmurs. No JVD. Abdomen: Soft non-tender. Liver size normal to percussion/palpation. Spleen not palpable. No obvious ascites. Extremities: No edema. No muscle wasting. No gross arthritic changes. Neurologic: Alert and oriented. Cranial nerves grossly intact. No asterixis.     LABORATORY STUDIES:  Liver [place] [address] Units 4/16/2019   WBC 3.4 - 10.8 x10E3/uL 5.3   ANC 1.4 - 7.0 x10E3/uL 3.0   HGB 11.1 - 15.9 g/dL 13.8    - 450 x10E3/uL 266   INR 0.8 - 1.2    AST 0 - 40 IU/L 25   ALT 0 - 32 IU/L 18   Alk Phos 39 - 117 IU/L 85   Bili, Total 0.0 - 1.2 mg/dL 0.4   Bili, Direct 0.00 - 0.40 mg/dL    Albumin 3.6 - 4.8 g/dL 4.9 (H)   BUN 8 - 27 mg/dL 12   Creat 0.57 - 1.00 mg/dL 0.85   Na 134 - 144 mmol/L 143   K 3.5 - 5.2 mmol/L 4.1   Cl 96 - 106 mmol/L 101   CO2 20 - 29 mmol/L 20   Glucose 65 - 99 mg/dL 90     SEROLOGIES:  6/26/2019:  HCV GT: unable to be determined  HCV RNA: 38,200,000  HBV sAb: positive  HBV sAg: negative  HAV Ab total: positive  HBV core Ab: negative    LIVER HISTOLOGY:  Not available or performed    ENDOSCOPIC PROCEDURES:  Not available or performed    RADIOLOGY:  Not available or performed    OTHER TESTING:  Not available or performed    FOLLOW-UP:  All of the issues listed above in the assessment and plan were discussed with the patient. All questions were answered. The patient expressed a clear understanding of the above. [address] [address] 4 weeks after initiation of medical therapy. The patient was advised to call the office when she receives her medication to provide us with her start date and to schedule her 4 week treatment follow up appointment.      Soledad Roth, TOPHER-BC  Liver [place] [address] [address] [place]  22.  [phone]

## 2019-08-27 LAB
ALBUMIN SERPL-MCNC: 4.3 G/DL (ref 3.6–4.8)
ALP SERPL-CCNC: 65 IU/L (ref 39–117)
ALT SERPL-CCNC: 16 IU/L (ref 0–32)
AST SERPL-CCNC: 19 IU/L (ref 0–40)
BILIRUB DIRECT SERPL-MCNC: 0.08 MG/DL (ref 0–0.4)
BILIRUB SERPL-MCNC: 0.3 MG/DL (ref 0–1.2)
BUN SERPL-MCNC: 12 MG/DL (ref 8–27)
BUN/CREAT SERPL: 13 (ref 12–28)
CALCIUM SERPL-MCNC: 9.9 MG/DL (ref 8.7–10.3)
CHLORIDE SERPL-SCNC: 100 MMOL/L (ref 96–106)
CO2 SERPL-SCNC: 27 MMOL/L (ref 20–29)
CREAT SERPL-MCNC: 0.93 MG/DL (ref 0.57–1)
ERYTHROCYTE [DISTWIDTH] IN BLOOD BY AUTOMATED COUNT: 13.6 % (ref 12.3–15.4)
GLUCOSE SERPL-MCNC: 106 MG/DL (ref 65–99)
HCT VFR BLD AUTO: 36.4 % (ref 34–46.6)
HGB BLD-MCNC: 11.9 G/DL (ref 11.1–15.9)
INR PPP: 1 (ref 0.8–1.2)
MCH RBC QN AUTO: 28.1 PG (ref 26.6–33)
MCHC RBC AUTO-ENTMCNC: 32.7 G/DL (ref 31.5–35.7)
MCV RBC AUTO: 86 FL (ref 79–97)
PLATELET # BLD AUTO: 269 X10E3/UL (ref 150–450)
POTASSIUM SERPL-SCNC: 4.1 MMOL/L (ref 3.5–5.2)
PROT SERPL-MCNC: 7.4 G/DL (ref 6–8.5)
PROTHROMBIN TIME: 10.1 SEC (ref 9.1–12)
RBC # BLD AUTO: 4.24 X10E6/UL (ref 3.77–5.28)
SODIUM SERPL-SCNC: 142 MMOL/L (ref 134–144)
WBC # BLD AUTO: 6 X10E3/UL (ref 3.4–10.8)

## 2019-08-28 LAB
A2 MACROGLOB SERPL-MCNC: 144 MG/DL (ref 110–276)
ALT SERPL W P-5'-P-CCNC: 20 IU/L (ref 0–40)
APO A-I SERPL-MCNC: 120 MG/DL (ref 116–209)
BILIRUB SERPL-MCNC: 0.3 MG/DL (ref 0–1.2)
COMMENT: NORMAL
FIBROSIS SCORING:, 550107: NORMAL
FIBROSIS STAGE SERPL QL: NORMAL
GGT SERPL-CCNC: 15 IU/L (ref 0–60)
HAPTOGLOB SERPL-MCNC: 82 MG/DL (ref 34–200)
INTERPRETATION, 550106: NORMAL
LIVER FIBR SCORE SERPL CALC.FIBROSURE: 0.12 (ref 0–0.21)
NECROINFLAMM ACTIVITY SCORING:, 550121: NORMAL
NECROINFLAMMATORY ACT GRADE SERPL QL: NORMAL
NECROINFLAMMATORY ACT SCORE SERPL: 0.06 (ref 0–0.17)
SERVICE CMNT-IMP: NORMAL

## 2019-10-07 PROBLEM — B18.2 CHRONIC HEPATITIS C WITHOUT HEPATIC COMA (HCC): Status: ACTIVE | Noted: 2019-10-07

## 2019-10-09 ENCOUNTER — TELEPHONE (OUTPATIENT)
Dept: PRIMARY CARE CLINIC | Age: 59
End: 2019-10-09

## 2019-10-09 DIAGNOSIS — M81.0 AGE-RELATED OSTEOPOROSIS WITHOUT CURRENT PATHOLOGICAL FRACTURE: Primary | ICD-10-CM

## 2019-10-09 NOTE — TELEPHONE ENCOUNTER
So it sounds like she was already approved back in August?  Was there a copay or something that held patient back from getting? Just wondering. .. Anyways, will wait to hear from [facility]. If not approved or high copay, may need to consider different med.

## 2019-10-09 NOTE — TELEPHONE ENCOUNTER
See previous encounter about this from 8/20/19, patient at the time had $100 deductible and had to pay 10% of visit, injection and administration fee. Patient was informed and declined getting the treatment due to cost. Hopefully with the PA submitted and approved as of 8/22/19 the outcome will be different. Not sure why it took until now to receive something from [facility]. I was never informed of anything after PA was approved as they said I would be.

## 2019-10-09 NOTE — TELEPHONE ENCOUNTER
PA was submitted for [employer] back in 8/20/19 and it was approved. Received a fax from [employer], saying patient should be due for injection. I have called patient's daughter to see if she ever got injection and Ms. Jones stated she never got it due to insurance not covering it. I will call [employer] and see what is going on. [phone].

## 2019-10-09 NOTE — TELEPHONE ENCOUNTER
I called and spoke with Nataliia Armstrong with [employer], she stated they will re-run patient's benefits and see if anything has changed since I completed PA back on 8/20/19, and once it is completed they will send a fax within 5 business days. I will update again once I have an answer.

## 2019-10-17 RX ORDER — ALENDRONATE SODIUM 70 MG/1
70 TABLET ORAL
Qty: 12 TAB | Refills: 0 | Status: SHIPPED | OUTPATIENT
Start: 2019-10-17 | End: 2020-01-20

## 2019-10-17 NOTE — TELEPHONE ENCOUNTER
Fosamax sent to pharmacy. She will take one tablet once weekly. She needs to take med first thing in the morning with 8 oz water without food or drink for 30 minutes afterwards and sitting upright.

## 2019-10-17 NOTE — TELEPHONE ENCOUNTER
I received a fax from [employer] in regards to Ms. Hernández's prolia. It still states the same thing after reverification was ran. I called and spoke with Ms. Reynoldsshantal in regards to Ms. Hernández. She states she would like to try to take fosamax once a week as she does not really want to spend the money that the injection is requesting. Please send it to [facility] in [employer]. I will f/u with pharmacy and start PA if needed.

## 2019-10-30 ENCOUNTER — OFFICE VISIT (OUTPATIENT)
Dept: HEMATOLOGY | Age: 59
End: 2019-10-30

## 2019-10-30 VITALS
HEART RATE: 87 BPM | TEMPERATURE: 97.6 F | OXYGEN SATURATION: 98 % | SYSTOLIC BLOOD PRESSURE: 107 MMHG | BODY MASS INDEX: 23.55 KG/M2 | HEIGHT: 62 IN | DIASTOLIC BLOOD PRESSURE: 63 MMHG | WEIGHT: 128 LBS | RESPIRATION RATE: 18 BRPM

## 2019-10-30 DIAGNOSIS — B18.2 CHRONIC HEPATITIS C WITHOUT HEPATIC COMA (HCC): Primary | ICD-10-CM

## 2019-10-30 NOTE — PROGRESS NOTES
[address], MD, Camilla Angelucci, Stafford Copas, MD Bula Lin, PA-C Mickey Putt, [facility]-BC     April S Christy, [facility]   [place] Radha, BRENDON    Marisa Alvarado, [facility]       Joshua Bailey [facility] [address]    at [place] [address] [address], [address] [address].    [phone]    FAX: [address] [place] [address], [address]    [phone]    FAX: [phone]     Patient Care Team:  Cornelio Waters NP as PCP - [facility]  Rani Morales MD (Obstetrics & Gynecology)  Cecilia Ca MD (Gastroenterology)    Problem List  Date Reviewed: 10/7/2019          Codes Class Noted    Chronic hepatitis C without hepatic coma [facility]) ICD-10-CM: B18.2  ICD-9-CM: 070.54  10/7/2019        Age-related osteoporosis without current pathological fracture ICD-10-CM: M81.0  ICD-9-CM: 733.01  5/20/2019        Essential hypertension ICD-10-CM: I10  ICD-9-CM: 401.9  4/15/2019        Gastroesophageal reflux disease without esophagitis ICD-10-CM: K21.9  ICD-9-CM: 530.81  4/15/2019            Danny Hernández returns to the [address] for management of chronic HCV. The active problem list, all pertinent past medical history, medications, liver histology, radiologic findings and laboratory findings related to the liver disorder were reviewed with the patient. The patient is a 60 y.o.  female who was screened for anti-HCV and tested positive. Her daughter is with her to translate. Risk factor for acquiring HCV is immigration from [place]. An ultrasound demonstrated a normal appearing liver. An assessment of liver fibrosis with FibroSure demonstrated no fibrosis.      The patient has just received her first month of [address]. The patient and daughter would like instructions on how to take the medication. The patient has no symptoms which can be attributed to the liver disorder. The patient has not experienced the following symptoms: pain in the right side over the liver, yellowing of the eyes or skin or problems concentrating. The patient completes all daily activities without any functional limitations. ASSESSMENT AND PLAN:  Chronic HCV   Chronic HCV with no fibrosis. Liver transaminases are normal. ALP is normal. Liver function is normal. Total bilirubin is normal. Serum albumin is normal. The platelet count is normal.      Will perform laboratory testing to monitor liver function and degree of liver injury. This included BMP, hepatic panel, CBC with platelet count and INR. Chronic HCV Treatment  The patient has not been treated for HCV. The patient has HCV genotype that is not yet defined. It was unable to be determined in previous genotype testing. The patient has received her first box of [address]. I have prescribed 8 weeks of therapy. We reviewed how to take the medication, how to open the packaging and I answered all questions. Screening for hepatocellular carcinoma  HCC screening is not necessary if the patient has no evidence of cirrhosis. Treatment of other medical problems in patients with chronic liver disease  There are no contraindications for the patient to take most medications necessary for treatment of other medical issues. The patient can take any medications utilized for treatment of DM and/or satins to treat hypercholesterolemia. The patient does not consume alcohol on a daily basis. She can take acetaminophen as needed for fever or pain. Counseling for alcohol in patients with chronic liver disease  The patient was counseled regarding alcohol consumption and the effect of alcohol on chronic liver disease.     The patient does not consume any significant amount of alcohol. Vaccinations   Vaccination for viral hepatitis A and B is not needed. The patient has serologic evidence of prior exposure or vaccination with immunity. Routine vaccinations against other bacterial and viral agents can be performed as indicated. Annual flu vaccination should be administered if indicated. ALLERGIES  No Known Allergies    MEDICATIONS  Current Outpatient Medications   Medication Sig    alendronate (FOSAMAX) 70 mg tablet Take 1 Tab by mouth every seven (7) days.  glecaprevir-pibrentasvir (MAVYRET) 100-40 mg tab Take 3 Tabs by mouth daily. Indications: GT unable to be determined. Non-cirrhotic.  multivitamin (ONE A DAY) tablet Take 1 Tab by mouth daily.  cyanocobalamin (VITAMIN B-12) 100 mcg tablet Take 100 mcg by mouth daily. Indications: unsure of dose    OTHER Indications: vitamin from [place]    amLODIPine (NORVASC) 5 mg tablet Take 1 Tab by mouth daily.  raNITIdine (ZANTAC) 150 mg tablet Take 150 mg by mouth as needed for Indigestion.  simethicone (GAS-X EXTRA STRENGTH) 125 mg chewable tablet Take 125 mg by mouth every six (6) hours as needed for Flatulence. No current facility-administered medications for this visit. SYSTEM REVIEW NOT RELATED TO LIVER DISEASE OR REVIEWED ABOVE:  Constitution systems: Negative for fever, chills, weight gain, weight loss. Eyes: Negative for visual changes. ENT: Negative for sore throat, painful swallowing. Respiratory: Negative for cough, hemoptysis, SOB. Cardiology: Negative for chest pain, palpitations. GI:  Negative for constipation or diarrhea. : Negative for urinary frequency, dysuria, hematuria, nocturia. Skin: Negative for rash. Hematology: Negative for easy bruising, blood clots. Musculo-skeletal: Negative for back pain, muscle pain, weakness. Neurologic: Negative for headaches, dizziness, vertigo, memory problems not related to HE. Psychology: Negative for anxiety, depression. FAMILY HISTORY:  There is no family history of liver disease. There is no family history of immune disorders. SOCIAL HISTORY:  The patient is . The patient has never used tobacco products. The patient has never consumed significant amounts of alcohol. The patient currently works full time. PHYSICAL EXAMINATION:  Visit Vitals  /63 (BP 1 Location: Right arm, BP Patient Position: Sitting)   Pulse 87   Temp 97.6 °F (36.4 °C) (Temporal)   Resp 18   Ht 5' 2\" (1.575 m)   Wt 128 lb (58.1 kg)   SpO2 98%   BMI 23.41 kg/m²     General: No acute distress. Eyes: Sclera anicteric. ENT: No oral lesions. Nodes: No adenopathy. Skin: No spider angiomata. No jaundice. No palmar erythema. Respiratory: Lungs clear to auscultation. Cardiovascular: Regular heart rate. No murmurs. No JVD. Abdomen: Soft non-tender. Liver size normal to percussion/palpation. Spleen not palpable. No obvious ascites. Extremities: No edema. No muscle wasting. No gross arthritic changes. Neurologic: Alert and oriented. Cranial nerves grossly intact. No asterixis. LABORATORY STUDIES:  Liver [place] of [address] [place] [address] & Units 8/26/2019 4/16/2019   WBC 3.4 - 10.8 x10E3/uL 6.0 5.3   ANC 1.4 - 7.0 x10E3/uL  3.0   HGB 11.1 - 15.9 g/dL 11.9 13.8    - 450 x10E3/uL 269 266   INR 0.8 - 1.2 1.0    AST 0 - 40 IU/L 19 25   ALT 0 - 32 IU/L 16 18   Alk Phos 39 - 117 IU/L 65 85   Bili, Total 0.0 - 1.2 mg/dL 0.3 0.4   Bili, Direct 0.00 - 0.40 mg/dL 0.08    Albumin 3.6 - 4.8 g/dL 4.3 4.9 (H)   BUN 8 - 27 mg/dL 12 12   Creat 0.57 - 1.00 mg/dL 0.93 0.85   Na 134 - 144 mmol/L 142 143   K 3.5 - 5.2 mmol/L 4.1 4.1   Cl 96 - 106 mmol/L 100 101   CO2 20 - 29 mmol/L 27 20   Glucose 65 - 99 mg/dL 106 (H) 90     SEROLOGIES:  6/26/2019:  HCV GT: unable to be determined  HCV RNA: 38,200,000  HBV sAb: positive  HBV sAg: negative  HAV Ab total: positive  HBV core Ab: negative    LIVER HISTOLOGY:  8/2019. FibroSure. F0.    ENDOSCOPIC PROCEDURES:  Not available or performed    RADIOLOGY:  Not available or performed    OTHER TESTING:  Not available or performed    FOLLOW-UP:  All of the issues listed above in the assessment and plan were discussed with the patient. All questions were answered. The patient expressed a clear understanding of the above. [facility] [address] 4 weeks after initiation of medical therapy. She is starting the medication tomorrow, so schedule follow up 4 weeks after that.      Jadiel Garvey, L.V. [facility]-BC  Liver [place] [address], [address] [place] [address]  [address].  [phone]

## 2019-10-30 NOTE — LETTER
10/31/19 Patient: [employer] YOB: 1960 Date of Visit: 10/30/2019 Shavonne Guerin NP 
[address] [address] VIA In Basket Dear Shavonne Guerin NP, Thank you for referring Ms. Danny Hernández to [address] for evaluation. My notes for this consultation are attached. If you have questions, please do not hesitate to call me. I look forward to following your patient along with you. Sincerely, Valerie Brenner NP

## 2019-10-30 NOTE — PROGRESS NOTES
Michael Ornelas is a 60 y.o. female  HIPAA verified by two patient identifiers. Chief Complaint   Patient presents with    Hepatitis C     follow up Daughter inturperter     Visit Vitals  /63 (BP 1 Location: Right arm, BP Patient Position: Sitting)   Pulse 87   Temp 97.6 °F (36.4 °C) (Temporal)   Resp 18   Ht 5' 2\" (1.575 m)   Wt 128 lb (58.1 kg)   SpO2 98%   BMI 23.41 kg/m²       Pain Scale: 0 - No pain/10  Pain Location:     1. Have you been to the ER, urgent care clinic since your last visit? Hospitalized since your last visit? No    2. Have you seen or consulted any other health care providers outside of the [address] [place] [address] since your last visit? Include any pap smears or colon screening.  No

## 2019-11-21 RX ORDER — GLECAPREVIR AND PIBRENTASVIR 40; 100 MG/1; MG/1
TABLET, FILM COATED ORAL
Qty: 84 TAB | Refills: 1 | Status: SHIPPED | OUTPATIENT
Start: 2019-11-21 | End: 2020-01-16 | Stop reason: ALTCHOICE

## 2019-12-23 DIAGNOSIS — I10 ESSENTIAL HYPERTENSION: ICD-10-CM

## 2019-12-23 RX ORDER — AMLODIPINE BESYLATE 5 MG/1
TABLET ORAL
Qty: 30 TAB | Refills: 2 | Status: SHIPPED | OUTPATIENT
Start: 2019-12-23 | End: 2020-03-20 | Stop reason: SDUPTHER

## 2020-01-16 ENCOUNTER — OFFICE VISIT (OUTPATIENT)
Dept: HEMATOLOGY | Age: 60
End: 2020-01-16

## 2020-01-16 VITALS
OXYGEN SATURATION: 99 % | TEMPERATURE: 97 F | HEIGHT: 62 IN | HEART RATE: 70 BPM | WEIGHT: 132 LBS | SYSTOLIC BLOOD PRESSURE: 117 MMHG | BODY MASS INDEX: 24.29 KG/M2 | DIASTOLIC BLOOD PRESSURE: 60 MMHG

## 2020-01-16 DIAGNOSIS — B18.2 CHRONIC HEPATITIS C WITHOUT HEPATIC COMA (HCC): Primary | ICD-10-CM

## 2020-01-16 NOTE — PROGRESS NOTES
Magaly Parisi is a 60 y.o. female  Chief Complaint   Patient presents with    Follow-up     HEP C FOLLOW UP          Visit Vitals  /60 (BP 1 Location: Left arm, BP Patient Position: Sitting)   Pulse 70   Temp 97 °F (36.1 °C) (Tympanic)   Ht 5' 2\" (1.575 m)   Wt 132 lb (59.9 kg)   SpO2 99%   BMI 24.14 kg/m²     3 most recent PHQ Screens 1/16/2020   Little interest or pleasure in doing things Not at all   Feeling down, depressed, irritable, or hopeless Not at all   Total Score PHQ 2 0     Learning Assessment 8/26/2019   PRIMARY LEARNER Patient   HIGHEST LEVEL OF EDUCATION - PRIMARY LEARNER  DID NOT GRADUATE [facility] PRIMARY LEARNER [address] CAREGIVER Yes   CO-LEARNER NAME daughter   PRIMARY LANGUAGE OTHER (COMMENT)   LEARNER PREFERENCE PRIMARY DEMONSTRATION     -   ANSWERED BY patient   RELATIONSHIP SELF     Abuse Screening Questionnaire 8/26/2019   Do you ever feel afraid of your partner? N   Are you in a relationship with someone who physically or mentally threatens you? N   Is it safe for you to go home? Y         1. Have you been to the ER, urgent care clinic since your last visit? Hospitalized since your last visit? No    2. Have you seen or consulted any other health care providers outside of the [address] [place] [address] since your last visit? Include any pap smears or colon screening.  No

## 2020-01-16 NOTE — PROGRESS NOTES
[address] [facility], MD, Nirali Page MD [facility], SIRI Krishnamurthy, [facility]     Piper Harrison, [facility]   TIP Villegas-BETTY Washington, [facility]       Joshua Bailey Mike De Woodson [address]    at [address], [facility] Yola Prather  22.    [phone]    FAX: [address] [place] [address], [address]    [phone]    FAX: [phone]     Patient Care Team:  Francisco French NP as PCP - General (Nurse Practitioner)  Michael Michel MD (Obstetrics & Gynecology)  Consuelo Damon MD (Gastroenterology)    Problem List  Date Reviewed: 10/31/2019          Codes Class Noted    Chronic hepatitis C without hepatic coma [facility]) ICD-10-CM: B18.2  ICD-9-CM: 070.54  10/7/2019        Age-related osteoporosis without current pathological fracture ICD-10-CM: M81.0  ICD-9-CM: 733.01  5/20/2019        Essential hypertension ICD-10-CM: I10  ICD-9-CM: 401.9  4/15/2019        Gastroesophageal reflux disease without esophagitis ICD-10-CM: K21.9  ICD-9-CM: 530.81  4/15/2019            Danny Hernández returns to the [address] for management of chronic HCV. The active problem list, all pertinent past medical history, medications, liver histology, radiologic findings and laboratory findings related to the liver disorder were reviewed with the patient. The patient is a 60 y.o.  female who was screened for anti-HCV and tested positive. Her daughter is with her to translate. Risk factor for acquiring HCV is immigration from [place]. An ultrasound demonstrated a normal appearing liver. An assessment of liver fibrosis with FibroSure demonstrated no fibrosis.      The patient has completed both boxes of medication. Per the daughter, she missed no doses and completed the course around 12/31/2019. She had slight headaches for the first 2-3 days. The patient has no symptoms which can be attributed to the liver disorder. The patient has not experienced the following symptoms: pain in the right side over the liver, yellowing of the eyes or skin or problems concentrating. The patient completes all daily activities without any functional limitations. ASSESSMENT AND PLAN:  Chronic HCV   Chronic HCV with no fibrosis. Liver transaminases are normal. ALP is normal. Liver function is normal. Total bilirubin is normal. Serum albumin is normal. The platelet count is normal.      Will perform laboratory testing to monitor liver function and degree of liver injury. This included BMP, hepatic panel, CBC with platelet count and INR. Chronic HCV treatment  The patient has completed 8 weeks of Mavyret (10/2019 - 12/2019). Screening for hepatocellular carcinoma  HCC screening is not necessary if the patient has no evidence of cirrhosis. Treatment of other medical problems in patients with chronic liver disease  There are no contraindications for the patient to take most medications necessary for treatment of other medical issues. The patient can take any medications utilized for treatment of DM and/or satins to treat hypercholesterolemia. The patient does not consume alcohol on a daily basis. She can take acetaminophen as needed for fever or pain. Counseling for alcohol in patients with chronic liver disease  The patient was counseled regarding alcohol consumption and the effect of alcohol on chronic liver disease. The patient does not consume any significant amount of alcohol. Vaccinations   Vaccination for viral hepatitis A and B is not needed. The patient has serologic evidence of prior exposure or vaccination with immunity.  Routine vaccinations against other bacterial and viral agents can be performed as indicated. Annual flu vaccination should be administered if indicated. ALLERGIES  No Known Allergies    MEDICATIONS  Current Outpatient Medications   Medication Sig    amLODIPine (NORVASC) 5 mg tablet TAKE 1 TABLET BY MOUTH EVERY DAY    alendronate (FOSAMAX) 70 mg tablet Take 1 Tab by mouth every seven (7) days.  multivitamin (ONE A DAY) tablet Take 1 Tab by mouth daily.  cyanocobalamin (VITAMIN B-12) 100 mcg tablet Take 100 mcg by mouth daily. Indications: unsure of dose    OTHER Indications: [facility] from [facility]    raNITIdine (ZANTAC) 150 mg tablet Take 150 mg by mouth as needed for Indigestion.  simethicone (GAS-X EXTRA STRENGTH) 125 mg chewable tablet Take 125 mg by mouth every six (6) hours as needed for Flatulence. No current facility-administered medications for this visit. SYSTEM REVIEW NOT RELATED TO LIVER DISEASE OR REVIEWED ABOVE:  Constitution systems: Negative for fever, chills, weight gain, weight loss. Eyes: Negative for visual changes. ENT: Negative for sore throat, painful swallowing. Respiratory: Negative for cough, hemoptysis, SOB. Cardiology: Negative for chest pain, palpitations. GI:  Negative for constipation or diarrhea. : Negative for urinary frequency, dysuria, hematuria, nocturia. Skin: Negative for rash. Hematology: Negative for easy bruising, blood clots. Musculo-skeletal: Negative for back pain, muscle pain, weakness. Neurologic: Negative for headaches, dizziness, vertigo, memory problems not related to HE. Psychology: Negative for anxiety, depression. FAMILY HISTORY:  There is no family history of liver disease. There is no family history of immune disorders. SOCIAL HISTORY:  The patient is . The patient has never used tobacco products. The patient has never consumed significant amounts of alcohol. The patient currently works full time.       PHYSICAL EXAMINATION:  Visit Vitals  /60 (BP 1 Location: Left arm, BP Patient Position: Sitting)   Pulse 70   Temp 97 °F (36.1 °C) (Tympanic)   Ht 5' 2\" (1.575 m)   Wt 132 lb (59.9 kg)   SpO2 99%   BMI 24.14 kg/m²     General: No acute distress. Eyes: Sclera anicteric. ENT: No oral lesions. Nodes: No adenopathy. Skin: No spider angiomata. No jaundice. No palmar erythema. Respiratory: Lungs clear to auscultation. Cardiovascular: Regular heart rate. No murmurs. No JVD. Abdomen: Soft non-tender. Liver size normal to percussion/palpation. Spleen not palpable. No obvious ascites. Extremities: No edema. No muscle wasting. No gross arthritic changes. Neurologic: Alert and oriented. Cranial nerves grossly intact. No asterixis. LABORATORY STUDIES:  Liver [place] of [address]  [address] & Units 8/26/2019 4/16/2019   WBC 3.4 - 10.8 x10E3/uL 6.0 5.3   ANC 1.4 - 7.0 x10E3/uL  3.0   HGB 11.1 - 15.9 g/dL 11.9 13.8    - 450 x10E3/uL 269 266   INR 0.8 - 1.2 1.0    AST 0 - 40 IU/L 19 25   ALT 0 - 32 IU/L 16 18   Alk Phos 39 - 117 IU/L 65 85   Bili, Total 0.0 - 1.2 mg/dL 0.3 0.4   Bili, Direct 0.00 - 0.40 mg/dL 0.08    Albumin 3.6 - 4.8 g/dL 4.3 4.9 (H)   BUN 8 - 27 mg/dL 12 12   Creat 0.57 - 1.00 mg/dL 0.93 0.85   Na 134 - 144 mmol/L 142 143   K 3.5 - 5.2 mmol/L 4.1 4.1   Cl 96 - 106 mmol/L 100 101   CO2 20 - 29 mmol/L 27 20   Glucose 65 - 99 mg/dL 106 (H) 90     Virology Latest Ref Rng & Units 5/20/2019   HCV RNA (IU) IU/mL 34,600,000     SEROLOGIES:  6/26/2019:  HCV GT: unable to be determined  HCV RNA: 38,200,000  HBV sAb: positive  HBV sAg: negative  HAV Ab total: positive  HBV core Ab: negative    LIVER HISTOLOGY:  8/2019. FibroSure. F0.    ENDOSCOPIC PROCEDURES:  Not available or performed    RADIOLOGY:  Not available or performed    OTHER TESTING:  Not available or performed    FOLLOW-UP:  All of the issues listed above in the assessment and plan were discussed with the patient. All questions were answered.  The patient expressed a clear understanding of the above. [facility] in 3 months for SVR visit.      Sadie Castaneda [facility]  L[facility] [place] [facility] [address] [place], [address] Yola Prather  22.  [phone]

## 2020-01-17 LAB
ALBUMIN SERPL-MCNC: 4.5 G/DL (ref 3.6–4.8)
ALP SERPL-CCNC: 65 IU/L (ref 39–117)
ALT SERPL-CCNC: 10 IU/L (ref 0–32)
AST SERPL-CCNC: 16 IU/L (ref 0–40)
BILIRUB DIRECT SERPL-MCNC: 0.08 MG/DL (ref 0–0.4)
BILIRUB SERPL-MCNC: 0.3 MG/DL (ref 0–1.2)
BUN SERPL-MCNC: 11 MG/DL (ref 8–27)
BUN/CREAT SERPL: 10 (ref 12–28)
CALCIUM SERPL-MCNC: 9.7 MG/DL (ref 8.7–10.3)
CHLORIDE SERPL-SCNC: 101 MMOL/L (ref 96–106)
CO2 SERPL-SCNC: 24 MMOL/L (ref 20–29)
CREAT SERPL-MCNC: 1.06 MG/DL (ref 0.57–1)
ERYTHROCYTE [DISTWIDTH] IN BLOOD BY AUTOMATED COUNT: 12.5 % (ref 11.7–15.4)
GLUCOSE SERPL-MCNC: 83 MG/DL (ref 65–99)
HCT VFR BLD AUTO: 38.8 % (ref 34–46.6)
HGB BLD-MCNC: 12.9 G/DL (ref 11.1–15.9)
MCH RBC QN AUTO: 28.1 PG (ref 26.6–33)
MCHC RBC AUTO-ENTMCNC: 33.2 G/DL (ref 31.5–35.7)
MCV RBC AUTO: 85 FL (ref 79–97)
PLATELET # BLD AUTO: 296 X10E3/UL (ref 150–450)
POTASSIUM SERPL-SCNC: 4.1 MMOL/L (ref 3.5–5.2)
PROT SERPL-MCNC: 7.8 G/DL (ref 6–8.5)
RBC # BLD AUTO: 4.59 X10E6/UL (ref 3.77–5.28)
SODIUM SERPL-SCNC: 140 MMOL/L (ref 134–144)
WBC # BLD AUTO: 5.2 X10E3/UL (ref 3.4–10.8)

## 2020-01-18 LAB — HCV RNA SERPL QL NAA+PROBE: NEGATIVE

## 2020-01-20 DIAGNOSIS — M81.0 AGE-RELATED OSTEOPOROSIS WITHOUT CURRENT PATHOLOGICAL FRACTURE: ICD-10-CM

## 2020-01-20 RX ORDER — ALENDRONATE SODIUM 70 MG/1
70 TABLET ORAL
Qty: 4 TAB | Refills: 2 | Status: SHIPPED | OUTPATIENT
Start: 2020-01-20 | End: 2020-05-05

## 2020-01-21 ENCOUNTER — DOCUMENTATION ONLY (OUTPATIENT)
Dept: HEMATOLOGY | Age: 60
End: 2020-01-21

## 2020-03-20 DIAGNOSIS — I10 ESSENTIAL HYPERTENSION: ICD-10-CM

## 2020-03-21 RX ORDER — AMLODIPINE BESYLATE 5 MG/1
TABLET ORAL
Qty: 30 TAB | Refills: 2 | Status: SHIPPED | OUTPATIENT
Start: 2020-03-21 | End: 2020-06-29 | Stop reason: SDUPTHER

## 2020-05-02 DIAGNOSIS — M81.0 AGE-RELATED OSTEOPOROSIS WITHOUT CURRENT PATHOLOGICAL FRACTURE: ICD-10-CM

## 2020-05-05 RX ORDER — ALENDRONATE SODIUM 70 MG/1
TABLET ORAL
Qty: 4 TAB | Refills: 2 | Status: SHIPPED | OUTPATIENT
Start: 2020-05-05 | End: 2020-10-12 | Stop reason: SDUPTHER

## 2020-06-17 DIAGNOSIS — M81.0 AGE-RELATED OSTEOPOROSIS WITHOUT CURRENT PATHOLOGICAL FRACTURE: ICD-10-CM

## 2020-06-29 DIAGNOSIS — I10 ESSENTIAL HYPERTENSION: ICD-10-CM

## 2020-06-30 RX ORDER — AMLODIPINE BESYLATE 5 MG/1
TABLET ORAL
Qty: 30 TAB | Refills: 0 | Status: SHIPPED | OUTPATIENT
Start: 2020-06-30 | End: 2020-07-30 | Stop reason: SDUPTHER

## 2020-07-30 ENCOUNTER — OFFICE VISIT (OUTPATIENT)
Dept: PRIMARY CARE CLINIC | Age: 60
End: 2020-07-30

## 2020-07-30 VITALS
HEART RATE: 78 BPM | RESPIRATION RATE: 16 BRPM | HEIGHT: 62 IN | OXYGEN SATURATION: 95 % | TEMPERATURE: 98 F | BODY MASS INDEX: 23.52 KG/M2 | DIASTOLIC BLOOD PRESSURE: 84 MMHG | SYSTOLIC BLOOD PRESSURE: 127 MMHG | WEIGHT: 127.8 LBS

## 2020-07-30 DIAGNOSIS — G47.00 INSOMNIA, UNSPECIFIED TYPE: ICD-10-CM

## 2020-07-30 DIAGNOSIS — M81.0 AGE-RELATED OSTEOPOROSIS WITHOUT CURRENT PATHOLOGICAL FRACTURE: ICD-10-CM

## 2020-07-30 DIAGNOSIS — Z76.0 MEDICATION REFILL: ICD-10-CM

## 2020-07-30 DIAGNOSIS — K21.9 GASTROESOPHAGEAL REFLUX DISEASE WITHOUT ESOPHAGITIS: Primary | ICD-10-CM

## 2020-07-30 DIAGNOSIS — I10 ESSENTIAL HYPERTENSION: ICD-10-CM

## 2020-07-30 RX ORDER — TRAZODONE HYDROCHLORIDE 50 MG/1
50 TABLET ORAL
Qty: 30 TAB | Refills: 2 | Status: SHIPPED | OUTPATIENT
Start: 2020-07-30

## 2020-07-30 RX ORDER — AMLODIPINE BESYLATE 5 MG/1
TABLET ORAL
Qty: 30 TAB | Refills: 5 | Status: SHIPPED | OUTPATIENT
Start: 2020-07-30 | End: 2021-02-26

## 2020-07-30 NOTE — PROGRESS NOTES
This note will not be viewable in [address] Makayla Yee is a 63y.o. year old female who is a new patient to me today. She is followed by [facility] NP     Makayla Yee is a  63 y.o. female presents for visit. Chief Complaint   Patient presents with    Follow-up     patient presents today c/o having trouble sleeping, acid reflux , and when she walks she feels shaky       HPI   Patient presents with her daughter who is translating from [place]. Waive . Endorses chronic acid reflux and belching. Symptoms presented more than 10 years ago and are keeping patient up at night. She was previously taking Tagamet but is on OTC famotidine 20 mg p.o. daily presently. Famotidine is not effective. Takes Fosamax for osteoporosis. Unable to tolerate Fosamax due to chronic GI problems. She is established patient with [facility] gastroenterology and will follow-up with her GI doctor. Prolia injections previously prescribed but have not been initiated. -Unclear reason. Endorses difficulty sleeping for more than 10 years. Unable to fall asleep or stay asleep. Acid reflux symptoms keep her awake. She endorses snoring but denies gasping for air in the middle of the night. Complains of daytime sleepiness. She took sleep medication in [place] but unable to recall the name of the medication. Blood pressure well controlled on amlodipine 5 mg p.o. daily. Review of Systems   Constitutional: Negative for chills and fever. Respiratory: Negative for shortness of breath and wheezing. Cardiovascular: Negative for chest pain and palpitations. Gastrointestinal: Positive for heartburn and nausea. All other systems reviewed and are negative.        Past Medical History:   Diagnosis Date    GERD (gastroesophageal reflux disease)     Hepatitis C     Hypertension     Osteoporosis       Past Surgical History:   Procedure Laterality Date    COLONOSCOPY N/A 8/12/2019    performed by Lucie Friday MD JOSE ALFREDO - tubular adenoma polyp; repeat 3 years    HX GI  1999    part of colon removed        Social History     Tobacco Use    Smoking status: Never Smoker    Smokeless tobacco: Never Used   Substance Use Topics    Alcohol use: Never     Frequency: Never      Social History     Social History Narrative    Not on file     Family History   Problem Relation Age of Onset    Other Mother         brain aneurysm    Hypertension Mother     No Known Problems Father     Colon Cancer Sister         age 46s      Prior to Admission medications    Medication Sig Start Date End Date Taking? Authorizing Provider   traZODone (DESYREL) 50 mg tablet Take 1 Tab by mouth nightly. 7/30/20  Yes Cherie Herrera NP   amLODIPine (NORVASC) 5 mg tablet TAKE 1 TABLET BY MOUTH EVERY DAY  Indications: high blood pressure 7/30/20  Yes Cherie Herrera NP   denosumab (PROLIA) 60 mg/mL injection 1 mL by SubCUTAneous route once for 1 dose. 7/30/20 7/30/20 Yes Cherie Herrera NP   alendronate (FOSAMAX) 70 mg tablet TAKE 1 TABLET BY MOUTH EVERY 7 DAYS 5/5/20  Yes Walter Rios MD   multivitamin (ONE A DAY) tablet Take 1 Tab by mouth daily. Yes Provider, Historical   cyanocobalamin (VITAMIN B-12) 100 mcg tablet Take 100 mcg by mouth daily. Indications: unsure of dose   Yes Provider, Historical   OTHER Indications: vitamin from [facility]   Yes Provider, Historical   simethicone (GAS-X EXTRA STRENGTH) 125 mg chewable tablet Take 125 mg by mouth every six (6) hours as needed for Flatulence. Yes Provider, Historical   amLODIPine (NORVASC) 5 mg tablet TAKE 1 TABLET BY MOUTH EVERY DAY 6/30/20 7/30/20  Walter Rios MD   denosumab (PROLIA) 60 mg/mL injection 1 mL by SubCUTAneous route once for 1 dose. 6/17/20 7/30/20  Donnie Lim NP   raNITIdine (ZANTAC) 150 mg tablet Take 150 mg by mouth as needed for Indigestion.   7/30/20  Provider, Historical      No Known Allergies       Visit Vitals  /84 (BP 1 Location: Right arm, BP Patient Position: Sitting)   Pulse 78   Temp 98 °F (36.7 °C) (Temporal)   Resp 16   Ht 5' 2\" (1.575 m)   Wt 127 lb 12.8 oz (58 kg)   SpO2 95%   BMI 23.37 kg/m²     Physical Exam  Vitals signs and nursing note reviewed. Constitutional:       General: She is not in acute distress. Appearance: Normal appearance. She is well-developed. HENT:      Head: Normocephalic and atraumatic. Right Ear: External ear normal.      Left Ear: External ear normal.   Eyes:      Conjunctiva/sclera: Conjunctivae normal.   Neck:      Musculoskeletal: Neck supple. Cardiovascular:      Rate and Rhythm: Normal rate and regular rhythm. Heart sounds: Normal heart sounds. Pulmonary:      Effort: Pulmonary effort is normal.      Breath sounds: Normal breath sounds. No wheezing. Abdominal:      General: Bowel sounds are normal. There is no distension. Palpations: Abdomen is soft. Tenderness: There is no abdominal tenderness. Musculoskeletal:         General: No swelling. Skin:     General: Skin is warm and dry. Neurological:      Mental Status: She is alert and oriented to person, place, and time. Psychiatric:         Mood and Affect: Mood normal.         Behavior: Behavior normal.         ASSESSMENT AND PLAN:  Patient Active Problem List    Diagnosis Date Noted    [facility] 10/07[facility]2019    Age-related osteoporosis without current pathological fracture 05/20/2019    Essential hypertension 04/15/2019    Gastroesophageal reflux disease without esophagitis 04/15/2019       ICD-10-CM ICD-9-CM   1. Gastroesophageal reflux disease without esophagitis  K21.9 530.81   2. Insomnia, unspecified type  G47.00 780.52   3. Essential hypertension  I10 401.9   4. Age-related osteoporosis without current pathological fracture  M81.0 733.01   5.  Medication refill  Z76.0 V68.1     Orders Placed This Encounter    REFERRAL TO GASTROENTEROLOGY     Referral Priority:   Routine     Referral Type:   Consultation     Referral Reason:   Specialty Services Required     Referred to Provider:   Lore Craven MD     Requested Specialty:   Gastroenterology     Number of Visits Requested:   1    traZODone (DESYREL) 50 mg tablet     Sig: Take 1 Tab by mouth nightly. Dispense:  30 Tab     Refill:  2    amLODIPine (NORVASC) 5 mg tablet     Sig: TAKE 1 TABLET BY MOUTH EVERY DAY  Indications: high blood pressure     Dispense:  30 Tab     Refill:  5     DX Code Needed  . I10    denosumab (PROLIA) 60 mg/mL injection     Si mL by SubCUTAneous route once for 1 dose. Dispense:  1 mL     Refill:  0       Diagnoses and all orders for this visit:    1. Gastroesophageal reflux disease without esophagitis  -     REFERRAL TO GASTROENTEROLOGY         -     Increase famotidine to 20 mg twice daily. 2. Insomnia, unspecified type  -     traZODone (DESYREL) 50 mg tablet; Take 1 Tab by mouth nightly. 3. Essential hypertension  -     amLODIPine (NORVASC) 5 mg tablet; TAKE 1 TABLET BY MOUTH EVERY DAY  Indications: high blood pressure    4. Age-related osteoporosis without current pathological fracture  -     denosumab (PROLIA) 60 mg/mL injection; 1 mL by SubCUTAneous route once for 1 dose. 5. Medication refill        the following changes in treatment are made: Trial of trazodone 50 mg p.o. nightly for insomnia. Will discontinue Fosamax after Prolia approved for osteoporosis. radiology results and schedule of future radiology studies reviewed with patient        Follow-up and Dispositions    · Return in about 4 weeks (around 2020), or if symptoms worsen or fail to improve, for f/u insomnia. Disclaimer:  Advised her to call back or return to office if symptoms worsen/change/persist.  Discussed expected course/resolution/complications of diagnosis in detail with patient. Medication risks/benefits/costs/interactions/alternatives discussed with patient.   She was given an after visit summary which includes diagnoses, current medications, & vitals. She expressed understanding with the diagnosis and plan.

## 2020-07-30 NOTE — PROGRESS NOTES
Chief Complaint   Patient presents with    Follow-up     patient presents today c/o having trouble sleeping, acid reflux , and when she walks she feels shaky

## 2020-10-12 DIAGNOSIS — M81.0 AGE-RELATED OSTEOPOROSIS WITHOUT CURRENT PATHOLOGICAL FRACTURE: ICD-10-CM

## 2020-10-16 ENCOUNTER — TELEPHONE (OUTPATIENT)
Dept: PRIMARY CARE CLINIC | Age: 60
End: 2020-10-16

## 2020-10-16 NOTE — TELEPHONE ENCOUNTER
Kieran Marie,    This was Regina's patient and I see that you saw her in July. Do you want to see her from now on? If so  then could you please send the refill? Thanks.

## 2020-10-19 RX ORDER — ALENDRONATE SODIUM 70 MG/1
TABLET ORAL
Qty: 4 TAB | Refills: 2 | Status: SHIPPED | OUTPATIENT
Start: 2020-10-19

## 2021-02-26 DIAGNOSIS — I10 ESSENTIAL HYPERTENSION: ICD-10-CM

## 2021-02-26 RX ORDER — AMLODIPINE BESYLATE 5 MG/1
TABLET ORAL
Qty: 30 TAB | Refills: 5 | Status: SHIPPED | OUTPATIENT
Start: 2021-02-26

## (undated) DEVICE — TRAP SURG QUAD PARABOLA SLOT DSGN SFTY SCRN TRAPEASE

## (undated) DEVICE — REM POLYHESIVE ADULT PATIENT RETURN ELECTRODE: Brand: VALLEYLAB

## (undated) DEVICE — TUBING HYDR IRR --

## (undated) DEVICE — SNARE ENDOSCP M L240CM W27MM SHTH DIA2.4MM CHN 2.8MM OVL